# Patient Record
Sex: FEMALE | Race: BLACK OR AFRICAN AMERICAN | NOT HISPANIC OR LATINO | Employment: OTHER
[De-identification: names, ages, dates, MRNs, and addresses within clinical notes are randomized per-mention and may not be internally consistent; named-entity substitution may affect disease eponyms.]

---

## 2017-10-01 ENCOUNTER — HEALTH MAINTENANCE LETTER (OUTPATIENT)
Age: 22
End: 2017-10-01

## 2019-11-08 ENCOUNTER — ALLIED HEALTH/NURSE VISIT (OUTPATIENT)
Dept: FAMILY MEDICINE | Facility: CLINIC | Age: 24
End: 2019-11-08

## 2019-11-08 VITALS — WEIGHT: 171 LBS | BODY MASS INDEX: 31.28 KG/M2

## 2019-11-08 DIAGNOSIS — Z32.00 POSSIBLE PREGNANCY, NOT YET CONFIRMED: Primary | ICD-10-CM

## 2019-11-08 LAB — HCG UR QL: POSITIVE

## 2019-11-08 PROCEDURE — 99207 ZZC NO CHARGE NURSE ONLY: CPT

## 2019-11-08 PROCEDURE — 81025 URINE PREGNANCY TEST: CPT | Performed by: NURSE PRACTITIONER

## 2019-11-08 RX ORDER — PRENATAL VIT/IRON FUM/FOLIC AC 27MG-0.8MG
1 TABLET ORAL DAILY
COMMUNITY
End: 2021-06-10

## 2019-11-08 NOTE — PROGRESS NOTES
Cassie Duarte is a 24 year old here today for a pregnancy test.  LMP: Patient's last menstrual period was 2019 (within days).  Wt: 171 lbs 0 oz.    Symptoms include breast tenderness, absence of menses, nausea &/or vomiting and fatigue.    Cassie informed of positive pregnancy test results. ARNAUD: 2020    Educational advice given: nutrition, smoking and drugs & alcohol.    Current medications reviewed: Yes    Previous pregnancy history remarkable for: 1st pregnancy.    Plan: take multivitamin or pre-mitch vitamins, OB Education packet given and pt will callback to schedule. Has to check with her insurance first. Encouraged her to schedule soon.     She is to call back if she has any questions or concerns.  She is advised to notify a provider immediately if she experiences any severe cramping or abdominal pain or any vaginal bleeding.    Elidia Shannon, RN, BSN

## 2019-12-10 ENCOUNTER — PRENATAL OFFICE VISIT (OUTPATIENT)
Dept: OBGYN | Facility: CLINIC | Age: 24
End: 2019-12-10

## 2019-12-10 VITALS — WEIGHT: 173.5 LBS | BODY MASS INDEX: 31.73 KG/M2

## 2019-12-10 DIAGNOSIS — O09.30 LATE PRENATAL CARE: ICD-10-CM

## 2019-12-10 DIAGNOSIS — Z34.00 ENCOUNTER FOR SUPERVISION PREGNANCY IN PRIMIGRAVIDA, ANTEPARTUM: Primary | ICD-10-CM

## 2019-12-10 LAB
ERYTHROCYTE [DISTWIDTH] IN BLOOD BY AUTOMATED COUNT: 14.5 % (ref 10–15)
HCT VFR BLD AUTO: 30.6 % (ref 35–47)
HGB BLD-MCNC: 10.6 G/DL (ref 11.7–15.7)
MCH RBC QN AUTO: 27.9 PG (ref 26.5–33)
MCHC RBC AUTO-ENTMCNC: 34.6 G/DL (ref 31.5–36.5)
MCV RBC AUTO: 81 FL (ref 78–100)
PLATELET # BLD AUTO: 245 10E9/L (ref 150–450)
RBC # BLD AUTO: 3.8 10E12/L (ref 3.8–5.2)
WBC # BLD AUTO: 7.1 10E9/L (ref 4–11)

## 2019-12-10 PROCEDURE — 36415 COLL VENOUS BLD VENIPUNCTURE: CPT | Performed by: OBSTETRICS & GYNECOLOGY

## 2019-12-10 PROCEDURE — 86762 RUBELLA ANTIBODY: CPT | Performed by: OBSTETRICS & GYNECOLOGY

## 2019-12-10 PROCEDURE — 86850 RBC ANTIBODY SCREEN: CPT | Performed by: OBSTETRICS & GYNECOLOGY

## 2019-12-10 PROCEDURE — 87389 HIV-1 AG W/HIV-1&-2 AB AG IA: CPT | Performed by: OBSTETRICS & GYNECOLOGY

## 2019-12-10 PROCEDURE — 86780 TREPONEMA PALLIDUM: CPT | Performed by: OBSTETRICS & GYNECOLOGY

## 2019-12-10 PROCEDURE — G0145 SCR C/V CYTO,THINLAYER,RESCR: HCPCS | Performed by: OBSTETRICS & GYNECOLOGY

## 2019-12-10 PROCEDURE — 87491 CHLMYD TRACH DNA AMP PROBE: CPT | Performed by: OBSTETRICS & GYNECOLOGY

## 2019-12-10 PROCEDURE — 99207 ZZC FIRST OB VISIT: CPT | Performed by: OBSTETRICS & GYNECOLOGY

## 2019-12-10 PROCEDURE — 87591 N.GONORRHOEAE DNA AMP PROB: CPT | Performed by: OBSTETRICS & GYNECOLOGY

## 2019-12-10 PROCEDURE — 86901 BLOOD TYPING SEROLOGIC RH(D): CPT | Performed by: OBSTETRICS & GYNECOLOGY

## 2019-12-10 PROCEDURE — 86900 BLOOD TYPING SEROLOGIC ABO: CPT | Performed by: OBSTETRICS & GYNECOLOGY

## 2019-12-10 PROCEDURE — 87340 HEPATITIS B SURFACE AG IA: CPT | Performed by: OBSTETRICS & GYNECOLOGY

## 2019-12-10 PROCEDURE — 85027 COMPLETE CBC AUTOMATED: CPT | Performed by: OBSTETRICS & GYNECOLOGY

## 2019-12-10 ASSESSMENT — PATIENT HEALTH QUESTIONNAIRE - PHQ9: SUM OF ALL RESPONSES TO PHQ QUESTIONS 1-9: 3

## 2019-12-10 NOTE — PROGRESS NOTES
Cassie is a 24 year old  @  19w3d weeks here for new ob visit.    Here with FOB.  See Ob questionnaire for pertinent components of HPI.  Current Issues include: nausea, mild    OBhx: never pregnant  Gyne: Pap smears Normal  History reviewed. No pertinent past medical history.  History reviewed. No pertinent surgical history.  Patient Active Problem List    Diagnosis Date Noted     NO ACTIVE PROBLEMS 2012     Priority: Medium        Allergies   Allergen Reactions     Cats      Long haired female     Current Outpatient Medications   Medication Sig Dispense Refill     Prenatal Vit-Fe Fumarate-FA (PRENATAL MULTIVITAMIN W/IRON) 27-0.8 MG tablet Take 1 tablet by mouth daily         Past Medical History of Father of Baby:   No significant medical history    Physical Exam: Wt 78.7 kg (173 lb 8 oz)   LMP 2019 (Exact Date)   Breastfeeding No   BMI 31.73 kg/m    General: Well developed, well nourished female  Skin: Normal  HEENT: Normal  Neck: Supple,no adenopathy,thyroid normal  Chest: Clear  Heart: Regular rate, rhythm,No murmur, rub, gallop  Breasts: No masses, skin, nipple or axillary changes   Abdomen: Benign,Soft, flat, non-tender,No masses, organomegaly,No inguinal nodes,Bowel sounds normoactive   Extremities: Normal  Neurological: Normal   Perineum: Intact   Vulva: Normal  Vagina: Normal mucosa, no discharge  Cervix: Nulliparous, closed, mobile,  no discharge  Uterus: 17 weeks, Normal shape, position and consistency   Adnexa: Normal  Rectum: deferred, Normal without lesion or mass   Bony Pelvis: Adequate       A/P 24 year old  at  19w3d weeks    ICD-10-CM    1. Encounter for supervision pregnancy in primigravida, antepartum Z34.00 US OB > 14 Weeks     ABO/Rh type and screen     Hepatitis B surface antigen     Rubella Antibody IgG Quantitative     CBC with platelets     Urine Culture Aerobic Bacterial     Neisseria gonorrhoeae PCR     Chlamydia trachomatis PCR     HIV Antigen Antibody Combo      Treponema Abs w Reflex to RPR and Titer     Pap imaged thin layer screen reflex to HPV if ASCUS - recommend age 25 - 29   2. Late prenatal care O09.30 US OB > 14 Weeks     ABO/Rh type and screen     Hepatitis B surface antigen     Rubella Antibody IgG Quantitative     CBC with platelets     Urine Culture Aerobic Bacterial     Neisseria gonorrhoeae PCR     Chlamydia trachomatis PCR     HIV Antigen Antibody Combo     Treponema Abs w Reflex to RPR and Titer     Pap imaged thin layer screen reflex to HPV if ASCUS - recommend age 25 - 29       - Discussed physician coverage, tertiary support, diet, exercise, weight gain, schedule of visits, routine and indicated ultrasounds, and childbirth education.    - Options for  testing for chromosomal and birth defects were discussed with the patient.  Diagnostic tests include CVS and Amniocentesis.  We discussed that these tests are definitive but invasive and do carry a risk of fetal loss.    Screening tests include nuchal translucency/blood marker testing in the first trimester and quad screening in the second trimester.  We discussed that these are screening tests and not diagnostic tests and that false positives and negatives are a distinct possibility.      Prenatal package provided.  return to clinic in 4 weeks.    CEPHAS AGBEH, MD.

## 2019-12-10 NOTE — LETTER
December 16, 2019      Cassie Duarte  [id number]   [place] [address]    Dear ,      I am happy to inform you that your recent cervical cancer screening test (PAP smear) was normal.      Preventative screenings such as this help to ensure your health for years to come. You should repeat a pap smear in 3 years, unless otherwise directed.      You will still need to return to the clinic every year for your annual exam and other preventive tests.     If you have additional questions regarding this result, please call our registered nurse, Courtney at [phone].      Sincerely,      Cephas Mawuena Agbeh, MD/abhinav

## 2019-12-10 NOTE — PATIENT INSTRUCTIONS
If you have any questions regarding your visit, Please contact your care team.  [employer] [facility]: [phone]  [facility] CLINIC HOURS TELEPHONE NUMBER   Cephas Agbeh, M.D.          Sabino Bhagat         Monday-[place]    8:00a.m-4:45 p.m    Tuesday--[place]     8:00a.m-4:45 p.m.    Thursday-Jamie    8:00a.m-4:45 p.m.    Friday-Jamie    8:00a.m-4:45 p.m    [facility]   [address]   [place] [address] [phone]-Ask for [facility]   Fax [phone]   Olqcwbh-[phone]     [facility] Labor and Delivery   [address] [place] [address] [place] [address]   [phone]    [facility]  [address] [facility] [address]  [phone]  [phone]-[phone]   Urgent Care locations:    [facility] Monday-Friday  5 pm - 9 pm  Saturday and Sunday   9 am - 5 pm   Monday-Friday   5 pm - 9 pm  Saturday and Sunday  9 am - 5 pm    [phone]   If you need a medication refill, please contact your pharmacy. Please allow 3 business days for your refill to be completed.  As always, Thank you for trusting us with your healthcare needs!

## 2019-12-11 LAB
ABO + RH BLD: NORMAL
ABO + RH BLD: NORMAL
BLD GP AB SCN SERPL QL: NORMAL
BLOOD BANK CMNT PATIENT-IMP: NORMAL
C TRACH DNA SPEC QL NAA+PROBE: NEGATIVE
HBV SURFACE AG SERPL QL IA: NONREACTIVE
HIV 1+2 AB+HIV1 P24 AG SERPL QL IA: NONREACTIVE
N GONORRHOEA DNA SPEC QL NAA+PROBE: NEGATIVE
RUBV IGG SERPL IA-ACNC: 22 IU/ML
SPECIMEN EXP DATE BLD: NORMAL
SPECIMEN SOURCE: NORMAL
SPECIMEN SOURCE: NORMAL
T PALLIDUM AB SER QL: NONREACTIVE

## 2019-12-13 LAB
COPATH REPORT: NORMAL
PAP: NORMAL

## 2019-12-14 ENCOUNTER — ANCILLARY PROCEDURE (OUTPATIENT)
Dept: ULTRASOUND IMAGING | Facility: CLINIC | Age: 24
End: 2019-12-14
Attending: OBSTETRICS & GYNECOLOGY

## 2019-12-14 DIAGNOSIS — O09.30 LATE PRENATAL CARE: ICD-10-CM

## 2019-12-14 DIAGNOSIS — Z34.00 ENCOUNTER FOR SUPERVISION PREGNANCY IN PRIMIGRAVIDA, ANTEPARTUM: ICD-10-CM

## 2019-12-14 PROCEDURE — 76805 OB US >/= 14 WKS SNGL FETUS: CPT

## 2020-01-02 ENCOUNTER — MYC MEDICAL ADVICE (OUTPATIENT)
Dept: FAMILY MEDICINE | Facility: CLINIC | Age: 25
End: 2020-01-02

## 2020-01-02 NOTE — TELEPHONE ENCOUNTER
Sent a mychart stating Dr. Agbeh will discuss this at her upcoming OB appointment 01.07.2020  Domitila Everett M.A.

## 2020-01-07 ENCOUNTER — PRENATAL OFFICE VISIT (OUTPATIENT)
Dept: OBGYN | Facility: CLINIC | Age: 25
End: 2020-01-07
Payer: COMMERCIAL

## 2020-01-07 VITALS
BODY MASS INDEX: 32.3 KG/M2 | DIASTOLIC BLOOD PRESSURE: 71 MMHG | HEART RATE: 85 BPM | WEIGHT: 176.6 LBS | OXYGEN SATURATION: 100 % | SYSTOLIC BLOOD PRESSURE: 110 MMHG

## 2020-01-07 DIAGNOSIS — Z34.00 ENCOUNTER FOR SUPERVISION PREGNANCY IN PRIMIGRAVIDA, ANTEPARTUM: Primary | ICD-10-CM

## 2020-01-07 PROCEDURE — 99207 ZZC PRENATAL VISIT: CPT | Performed by: OBSTETRICS & GYNECOLOGY

## 2020-01-07 NOTE — PROGRESS NOTES
20w2d. Doing well without issues/concerns.  Quad screen not done per pt request   Routine anticipatory guidance.  U/S  Ordered.    ICD-10-CM    1. Encounter for supervision pregnancy in primigravida, antepartum Z34.00 US OB > 14 Weeks     Glucose tolerance gest screen 1 hour     ABO and Rh     Hemoglobin     CEPHAS AGBEH, MD.

## 2020-01-17 ENCOUNTER — ANCILLARY PROCEDURE (OUTPATIENT)
Dept: ULTRASOUND IMAGING | Facility: CLINIC | Age: 25
End: 2020-01-17
Attending: OBSTETRICS & GYNECOLOGY
Payer: COMMERCIAL

## 2020-01-17 DIAGNOSIS — Z34.00 ENCOUNTER FOR SUPERVISION PREGNANCY IN PRIMIGRAVIDA, ANTEPARTUM: ICD-10-CM

## 2020-01-17 DIAGNOSIS — Z34.00 ENCOUNTER FOR SUPERVISION PREGNANCY IN PRIMIGRAVIDA, ANTEPARTUM: Primary | ICD-10-CM

## 2020-01-17 DIAGNOSIS — O09.30 LATE PRENATAL CARE: ICD-10-CM

## 2020-01-17 PROCEDURE — 76805 OB US >/= 14 WKS SNGL FETUS: CPT

## 2020-02-04 ENCOUNTER — PRENATAL OFFICE VISIT (OUTPATIENT)
Dept: OBGYN | Facility: CLINIC | Age: 25
End: 2020-02-04

## 2020-02-04 VITALS
SYSTOLIC BLOOD PRESSURE: 122 MMHG | HEART RATE: 89 BPM | BODY MASS INDEX: 33.91 KG/M2 | WEIGHT: 185.4 LBS | DIASTOLIC BLOOD PRESSURE: 74 MMHG

## 2020-02-04 DIAGNOSIS — O09.30 LATE PRENATAL CARE: ICD-10-CM

## 2020-02-04 DIAGNOSIS — Z34.00 ENCOUNTER FOR SUPERVISION PREGNANCY IN PRIMIGRAVIDA, ANTEPARTUM: Primary | ICD-10-CM

## 2020-02-04 DIAGNOSIS — D50.8 IRON DEFICIENCY ANEMIA SECONDARY TO INADEQUATE DIETARY IRON INTAKE: ICD-10-CM

## 2020-02-04 DIAGNOSIS — Z34.00 ENCOUNTER FOR SUPERVISION PREGNANCY IN PRIMIGRAVIDA, ANTEPARTUM: ICD-10-CM

## 2020-02-04 LAB
ABO + RH BLD: NORMAL
ABO + RH BLD: NORMAL
GLUCOSE 1H P 50 G GLC PO SERPL-MCNC: 85 MG/DL (ref 60–129)
HGB BLD-MCNC: 10.1 G/DL (ref 11.7–15.7)
SPECIMEN EXP DATE BLD: NORMAL

## 2020-02-04 PROCEDURE — 99207 ZZC PRENATAL VISIT: CPT | Performed by: OBSTETRICS & GYNECOLOGY

## 2020-02-04 PROCEDURE — 87186 SC STD MICRODIL/AGAR DIL: CPT | Performed by: OBSTETRICS & GYNECOLOGY

## 2020-02-04 PROCEDURE — 85018 HEMOGLOBIN: CPT | Performed by: OBSTETRICS & GYNECOLOGY

## 2020-02-04 PROCEDURE — 36415 COLL VENOUS BLD VENIPUNCTURE: CPT | Performed by: OBSTETRICS & GYNECOLOGY

## 2020-02-04 PROCEDURE — 86901 BLOOD TYPING SEROLOGIC RH(D): CPT | Performed by: OBSTETRICS & GYNECOLOGY

## 2020-02-04 PROCEDURE — 87088 URINE BACTERIA CULTURE: CPT | Performed by: OBSTETRICS & GYNECOLOGY

## 2020-02-04 PROCEDURE — 86900 BLOOD TYPING SEROLOGIC ABO: CPT | Performed by: OBSTETRICS & GYNECOLOGY

## 2020-02-04 PROCEDURE — 82950 GLUCOSE TEST: CPT | Performed by: OBSTETRICS & GYNECOLOGY

## 2020-02-04 PROCEDURE — 87086 URINE CULTURE/COLONY COUNT: CPT | Performed by: OBSTETRICS & GYNECOLOGY

## 2020-02-04 RX ORDER — FERROUS SULFATE 325(65) MG
325 TABLET ORAL 2 TIMES DAILY
Qty: 60 TABLET | Refills: 3 | Status: SHIPPED | OUTPATIENT
Start: 2020-02-04 | End: 2021-06-10

## 2020-02-04 NOTE — LETTER
[address] [address]  [phone]      February 4, 2020      RE: Cassie Duarte  [address]   [place] [address]       To whom it may concern:    Cassie Duarte was seen in our clinic today.  She may return to work with the following: Light duty-unable to lift more than 20 pounds  May not work more than 40 hors per week and not more than 8 hrs per day.      Sincerely,      Cephas Mawuena Agbeh MD

## 2020-02-04 NOTE — PATIENT INSTRUCTIONS
If you have any questions regarding your visit, Please contact your care team.  [employer] [facility]: [phone]  [facility] CLINIC HOURS TELEPHONE NUMBER   Cephas Agbeh, M.D.      Sabino Ramesh-  Dhara-         Monday-[place]    8:00a.m-4:45 p.m    Tuesday--[place]     8:00a.m-4:45 p.m.    Thursday-Jamie    8:00a.m-4:45 p.m.    Friday-[place]    8:00a.m-4:45 p.m    [facility]   [address] [place] [address]   [phone]-Ask for [facility]   Fax [phone]   Fbofrnw-[phone]     [facility] Labor and Delivery   [address] [place] [address] [place] [address]   [phone]    [facility]  [address] [address]  [phone]  Aktxxcu-[phone]   Urgent Care locations:    [facility] Monday-Friday  5 pm - 9 pm  Saturday and Sunday   9 am - 5 pm   Monday-Friday   5 pm - 9 pm  Saturday and Sunday  9 am - 5 pm    [phone]   If you need a medication refill, please contact your pharmacy. Please allow 3 business days for your refill to be completed.  As always, Thank you for trusting us with your healthcare needs!

## 2020-02-04 NOTE — PROGRESS NOTES
24w2d  Doing well without issues/concerns.Work note provided.  Routine anticipatory guidance.  F/UUS ordered.  RTC 4wk.  Glucola given. Plan for  GCT, Hgb.    ICD-10-CM    1. Encounter for supervision pregnancy in primigravida, antepartum Z34.00 US OB >14 Weeks Follow Up   2. Late prenatal care O09.30 US OB >14 Weeks Follow Up     CEPHAS AGBEH, MD.

## 2020-02-06 DIAGNOSIS — N30.00 ACUTE CYSTITIS WITHOUT HEMATURIA: Primary | ICD-10-CM

## 2020-02-06 LAB
BACTERIA SPEC CULT: ABNORMAL
BACTERIA SPEC CULT: ABNORMAL
SPECIMEN SOURCE: ABNORMAL

## 2020-02-06 RX ORDER — NITROFURANTOIN 25; 75 MG/1; MG/1
100 CAPSULE ORAL 2 TIMES DAILY
Qty: 14 CAPSULE | Refills: 0 | Status: SHIPPED | OUTPATIENT
Start: 2020-02-06 | End: 2020-04-09

## 2020-02-23 ENCOUNTER — HEALTH MAINTENANCE LETTER (OUTPATIENT)
Age: 25
End: 2020-02-23

## 2020-02-24 ENCOUNTER — ANCILLARY PROCEDURE (OUTPATIENT)
Dept: ULTRASOUND IMAGING | Facility: CLINIC | Age: 25
End: 2020-02-24
Attending: OBSTETRICS & GYNECOLOGY
Payer: COMMERCIAL

## 2020-02-24 ENCOUNTER — PRENATAL OFFICE VISIT (OUTPATIENT)
Dept: OBGYN | Facility: CLINIC | Age: 25
End: 2020-02-24
Payer: COMMERCIAL

## 2020-02-24 ENCOUNTER — TRANSFERRED RECORDS (OUTPATIENT)
Dept: HEALTH INFORMATION MANAGEMENT | Facility: CLINIC | Age: 25
End: 2020-02-24

## 2020-02-24 DIAGNOSIS — Z34.00 ENCOUNTER FOR SUPERVISION PREGNANCY IN PRIMIGRAVIDA, ANTEPARTUM: ICD-10-CM

## 2020-02-24 DIAGNOSIS — O60.02 PRETERM LABOR IN SECOND TRIMESTER WITHOUT DELIVERY: Primary | ICD-10-CM

## 2020-02-24 DIAGNOSIS — O09.30 LATE PRENATAL CARE: ICD-10-CM

## 2020-02-24 PROCEDURE — 76817 TRANSVAGINAL US OBSTETRIC: CPT | Performed by: RADIOLOGY

## 2020-02-24 PROCEDURE — 76805 OB US >/= 14 WKS SNGL FETUS: CPT | Performed by: RADIOLOGY

## 2020-02-24 PROCEDURE — 99207 ZZC COMPLICATED OB VISIT: CPT | Performed by: OBSTETRICS & GYNECOLOGY

## 2020-02-24 NOTE — PROGRESS NOTES
I received a phone call from Dr. Cody, the radiologist, who was concerned with today's US findings.  At 27 1/7 weeks gestation, the patient's cervix was noted to be dilated at 1.3 cm with fluid visible and 3 cm in length.  The patient c/o feeling pelvic pressure.  Therefore, she was sent directly to L&D after I reviewed these findings with the patient and then I called and gave report to Dr. Schmidt and Nathalia FLOREZ charge RN.  The patient understands to have her  drive her directly to [facility] and they voiced understanding.

## 2020-02-24 NOTE — PATIENT INSTRUCTIONS
If you have any questions regarding your visit, Please contact your care team.    [employer] [facility]: [phone]      [facility] HOURS TELEPHONE NUMBER   Ghada Fernandezmaco .    MYLA Smith RN Amber, ORACIO Engel RN     Monday, Wednesday, Thursday and Friday, [place]  8:30a.m-5:00 p.m   [facility]  [address] [place] [address]  [phone] ask for [facility]    Imaging [id number]-[phone]       Urgent Care locations:    [facility] Saturday and Sunday   9 am - 5 pm    Monday-Friday   12 pm - 8 pm  Saturday and Sunday   9 am - 5 pm   [phone]     [facility] Labor and Delivery:  [phone]    If you need a medication refill, please contact your pharmacy. Please allow 3 business days for your refill to be completed.  As always, Thank you for trusting us with your healthcare needs!

## 2020-02-25 ENCOUNTER — TRANSFERRED RECORDS (OUTPATIENT)
Dept: HEALTH INFORMATION MANAGEMENT | Facility: CLINIC | Age: 25
End: 2020-02-25

## 2020-04-09 ENCOUNTER — VIRTUAL VISIT (OUTPATIENT)
Dept: OBGYN | Facility: CLINIC | Age: 25
End: 2020-04-09
Payer: COMMERCIAL

## 2020-04-09 PROCEDURE — 99207 ZZC POST PARTUM EXAM: CPT | Mod: TEL | Performed by: OBSTETRICS & GYNECOLOGY

## 2020-04-09 ASSESSMENT — PATIENT HEALTH QUESTIONNAIRE - PHQ9: SUM OF ALL RESPONSES TO PHQ QUESTIONS 1-9: 5

## 2020-04-09 NOTE — PROGRESS NOTES
"Cassie Duarte is a 24 year old female who is being evaluated via a billable telephone visit.      The patient has been notified of following:     \"This telephone visit will be conducted via a call between you and your physician/provider. We have found that certain health care needs can be provided without the need for a physical exam.  This service lets us provide the care you need with a short phone conversation.  If a prescription is necessary we can send it directly to your pharmacy.  If lab work is needed we can place an order for that and you can then stop by our lab to have the test done at a later time.    Telephone visits are billed at different rates depending on your insurance coverage. During this emergency period, for some insurers they may be billed the same as an in-person visit.  Please reach out to your insurance provider with any questions.    If during the course of the call the physician/provider feels a telephone visit is not appropriate, you will not be charged for this service.\"    Patient has given verbal consent for Telephone visit?  Yes    How would you like to obtain your AVS? Vidalmaria esther    Cassie Duarte complains of    Chief Complaint   Patient presents with     Post Partum Exam       I have reviewed and updated the patient's Past Medical History, Social History, Family History and Medication List.    ARTHUR Matthews is here for a 6-week postpartum checkup.    She had a Primary  of a liveborn baby girl, weight 2 pounds 6 oz. ( premature) PTL. Baby still in hospital. She says incision is well healed. The delivery was uncomplicated.  Since delivery, she has been breast/pumping..  She has had a normal menses.  She has not had intercourse.  Patient screened for postpartum depression and complaints are NEGATIVE. Screening has also been completed for intimate partner violence.   OB History    Para Term  AB Living   1 1 0 1 0 1   SAB TAB Ectopic Multiple Live Births   0 0 0 0 1 "      # Outcome Date GA Lbr David/2nd Weight Sex Delivery Anes PTL Lv   1  20 27w3d  1.077 kg (2 lb 6 oz) F CS-Classical Spinal  RENITA      Complications:  labor, Footling breech presentation      Name: Lora       Her last pap was 12/10/19 and was Normal    EXAM: LMP 2019 (Exact Date)       A/P  Routine Postpartum    ICD-10-CM    1. Routine postpartum follow-up  Z39.2        1. Contraception: condom  2. Annual due in  every 12 months    CEPHAS AGBEH, MD.     Phone call duration: 15 minutes  CEPHAS AGBEH, MD.

## 2020-06-06 ENCOUNTER — TRANSFERRED RECORDS (OUTPATIENT)
Dept: HEALTH INFORMATION MANAGEMENT | Facility: CLINIC | Age: 25
End: 2020-06-06

## 2020-10-09 ENCOUNTER — PRENATAL OFFICE VISIT (OUTPATIENT)
Dept: OBGYN | Facility: CLINIC | Age: 25
End: 2020-10-09

## 2020-10-09 ENCOUNTER — TELEPHONE (OUTPATIENT)
Dept: OBGYN | Facility: CLINIC | Age: 25
End: 2020-10-09

## 2020-10-09 VITALS
OXYGEN SATURATION: 98 % | BODY MASS INDEX: 32.01 KG/M2 | DIASTOLIC BLOOD PRESSURE: 81 MMHG | SYSTOLIC BLOOD PRESSURE: 121 MMHG | WEIGHT: 175 LBS | HEART RATE: 89 BPM

## 2020-10-09 DIAGNOSIS — O09.891 SUPERVISION OF OTHER HIGH RISK PREGNANCIES, FIRST TRIMESTER: ICD-10-CM

## 2020-10-09 DIAGNOSIS — Z36.87 UNSURE OF LMP (LAST MENSTRUAL PERIOD) AS REASON FOR ULTRASOUND SCAN: ICD-10-CM

## 2020-10-09 DIAGNOSIS — O09.299 HISTORY OF CERVICAL INCOMPETENCE IN PREGNANCY, CURRENTLY PREGNANT: Primary | ICD-10-CM

## 2020-10-09 PROCEDURE — 99207 PR FIRST OB VISIT: CPT | Performed by: OBSTETRICS & GYNECOLOGY

## 2020-10-09 ASSESSMENT — PAIN SCALES - GENERAL: PAINLEVEL: NO PAIN (0)

## 2020-10-09 NOTE — PROGRESS NOTES
Cassie is a 25 year old female, , who is here today for her first OB visit at 8 5/7 weeks gestation.  She has had a positive home pregnancy test.  This is a high-risk pregnancy since she experienced cervical incompetence during her previous pregnancy and required a C/S for delivery at 27 3/7 weeks gestation (double footling breech) on 2020.  This is a closely-spaced pregnancy and she is not currently breastfeeding.  She felt flutters beginning 2-3 days ago.  She quit use of marijuana but has not started taking a PNV yet.      ROS: Ten point review of systems was reviewed and negative except the above.    Gyn Hx:      History reviewed. No pertinent past medical history.  Past Surgical History:   Procedure Laterality Date      SECTION       Patient Active Problem List   Diagnosis     NO ACTIVE PROBLEMS       ALL/Meds: Her medication and allergy histories were reviewed and are documented in their appropriate chart areas.    SH: Reviewed and documented in the appropriate area of the chart.    FH: Her family history was reviewed and documented in its appropriate chart area.    PE: /81 (BP Location: Left arm, Patient Position: Chair, Cuff Size: Adult Regular)   Pulse 89   Wt 79.4 kg (175 lb)   LMP 2020 (Exact Date)   SpO2 98%   Breastfeeding No   BMI 32.01 kg/m    Body mass index is 32.01 kg/m .    Urine HCG was +  Hrt - RRR without murmur  Lungs - CTAB  Neck - supple without palpable mass  Breasts - patient declined exam  Abd - soft, nontender, unable to hear fhts with the doppler today so will check with US, BS x 4  Pelvic - patient declined exam  Ext - negative    A/P:   - I discussed with her nutrition and medication concerns related to pregnancy.  We discussed folic acid supplementation.  We reviewed prenatal care.  She is given the opportunity to ask questions and have them answered.  Schedule an OB US to confirm dates and viability.  Refer to [facility] for possible cerclage  placement due to this patient's hx of cervical incompetence.  Will also need to find her op report to determine if she will be a  candidate or not.  She was encouraged to start taking a PNV daily po and to avoid marijuana usage.  Check OB labwork today.    30 minutes were spent face to face with the patient today discussing her history, diagnosis, and follow-up plan as noted above.  Over 50% of the visit was spent in counseling and coordination of care.    Total Visit Time: 30 minutes.    Orders Placed This Encounter   Procedures     US OB <14 Weeks w Transvaginal Single     CBC with platelets     HIV Antigen Antibody Combo     Treponema Abs w Reflex to RPR and Titer     Hepatitis B surface antigen     Rubella Antibody IgG Quantitative     PERINATOLOGY REFERRAL     ABO and Rh     Ghada Rosas DO  FACOG, FACS

## 2020-10-09 NOTE — TELEPHONE ENCOUNTER
Pt needs to have prenatal labs drawn.    Please call pt to schedule lab only appt.    Stacey Chávez RN on 10/9/2020 at 5:13 PM

## 2020-10-09 NOTE — PATIENT INSTRUCTIONS
If you have any questions regarding your visit, Please contact your care team.    [employer] [facility]: [phone]      [facility] CLINIC HOURS TELEPHONE NUMBER   Ghada Rosas DO.    MYLA Savage -  Dhara -       ORACIO Jones, RN  Stacey, RN     Monday, Wednesday, Thursday and Friday, [place]  8:30a.m-5:00 p.m   [facility]  [address] [place] [address]  [phone] ask for [facility]    Imaging [id number]-[phone]       Urgent Care locations:    [facility] Saturday and Sunday   9 am - 5 pm    Monday-Friday   12 pm - 8 pm  Saturday and Sunday   9 am - 5 pm   [phone]     [facility] Labor and Delivery:  [phone]    If you need a medication refill, please contact your pharmacy. Please allow 3 business days for your refill to be completed.  As always, Thank you for trusting us with your healthcare needs!

## 2020-10-12 NOTE — TELEPHONE ENCOUNTER
RN called and assisted pt in scheduling lab appt following her US appt on 10/15/2020.    Patient verbalized understanding and agreed to plan.     Stacey Chávez RN on 10/12/2020 at 9:43 AM

## 2020-10-15 ENCOUNTER — ANCILLARY PROCEDURE (OUTPATIENT)
Dept: ULTRASOUND IMAGING | Facility: CLINIC | Age: 25
End: 2020-10-15
Attending: OBSTETRICS & GYNECOLOGY

## 2020-10-15 DIAGNOSIS — Z36.87 UNSURE OF LMP (LAST MENSTRUAL PERIOD) AS REASON FOR ULTRASOUND SCAN: ICD-10-CM

## 2020-10-15 DIAGNOSIS — O09.891 SUPERVISION OF OTHER HIGH RISK PREGNANCIES, FIRST TRIMESTER: ICD-10-CM

## 2020-10-15 LAB
ABO + RH BLD: NORMAL
ABO + RH BLD: NORMAL
ERYTHROCYTE [DISTWIDTH] IN BLOOD BY AUTOMATED COUNT: 12.9 % (ref 10–15)
HCT VFR BLD AUTO: 31.8 % (ref 35–47)
HGB BLD-MCNC: 11.3 G/DL (ref 11.7–15.7)
MCH RBC QN AUTO: 28.1 PG (ref 26.5–33)
MCHC RBC AUTO-ENTMCNC: 35.5 G/DL (ref 31.5–36.5)
MCV RBC AUTO: 79 FL (ref 78–100)
PLATELET # BLD AUTO: 234 10E9/L (ref 150–450)
RBC # BLD AUTO: 4.02 10E12/L (ref 3.8–5.2)
SPECIMEN EXP DATE BLD: NORMAL
WBC # BLD AUTO: 5.6 10E9/L (ref 4–11)

## 2020-10-15 PROCEDURE — 99000 SPECIMEN HANDLING OFFICE-LAB: CPT | Performed by: OBSTETRICS & GYNECOLOGY

## 2020-10-15 PROCEDURE — 85027 COMPLETE CBC AUTOMATED: CPT | Performed by: OBSTETRICS & GYNECOLOGY

## 2020-10-15 PROCEDURE — 76817 TRANSVAGINAL US OBSTETRIC: CPT

## 2020-10-15 PROCEDURE — 86901 BLOOD TYPING SEROLOGIC RH(D): CPT | Performed by: OBSTETRICS & GYNECOLOGY

## 2020-10-15 PROCEDURE — 87086 URINE CULTURE/COLONY COUNT: CPT | Performed by: OBSTETRICS & GYNECOLOGY

## 2020-10-15 PROCEDURE — 86780 TREPONEMA PALLIDUM: CPT | Mod: 90 | Performed by: OBSTETRICS & GYNECOLOGY

## 2020-10-15 PROCEDURE — 76801 OB US < 14 WKS SINGLE FETUS: CPT

## 2020-10-15 PROCEDURE — 87186 SC STD MICRODIL/AGAR DIL: CPT | Performed by: OBSTETRICS & GYNECOLOGY

## 2020-10-15 PROCEDURE — 86900 BLOOD TYPING SEROLOGIC ABO: CPT | Performed by: OBSTETRICS & GYNECOLOGY

## 2020-10-15 PROCEDURE — 87340 HEPATITIS B SURFACE AG IA: CPT | Performed by: OBSTETRICS & GYNECOLOGY

## 2020-10-15 PROCEDURE — 87389 HIV-1 AG W/HIV-1&-2 AB AG IA: CPT | Performed by: OBSTETRICS & GYNECOLOGY

## 2020-10-15 PROCEDURE — 87088 URINE BACTERIA CULTURE: CPT | Performed by: OBSTETRICS & GYNECOLOGY

## 2020-10-15 PROCEDURE — 36415 COLL VENOUS BLD VENIPUNCTURE: CPT | Performed by: OBSTETRICS & GYNECOLOGY

## 2020-10-15 PROCEDURE — 86762 RUBELLA ANTIBODY: CPT | Performed by: OBSTETRICS & GYNECOLOGY

## 2020-10-16 LAB
HBV SURFACE AG SERPL QL IA: NONREACTIVE
HIV 1+2 AB+HIV1 P24 AG SERPL QL IA: NONREACTIVE
RUBV IGG SERPL IA-ACNC: 21 IU/ML
T PALLIDUM AB SER QL: NONREACTIVE

## 2020-10-18 LAB
BACTERIA SPEC CULT: ABNORMAL
Lab: ABNORMAL
SPECIMEN SOURCE: ABNORMAL

## 2020-10-19 DIAGNOSIS — N30.00 ACUTE CYSTITIS WITHOUT HEMATURIA: Primary | ICD-10-CM

## 2020-10-19 RX ORDER — AMOXICILLIN AND CLAVULANATE POTASSIUM 500; 125 MG/1; MG/1
1 TABLET, FILM COATED ORAL 3 TIMES DAILY
Qty: 21 TABLET | Refills: 0 | Status: SHIPPED | OUTPATIENT
Start: 2020-10-19 | End: 2020-12-04

## 2020-10-30 ENCOUNTER — TRANSFERRED RECORDS (OUTPATIENT)
Dept: HEALTH INFORMATION MANAGEMENT | Facility: CLINIC | Age: 25
End: 2020-10-30

## 2020-12-04 ENCOUNTER — PRENATAL OFFICE VISIT (OUTPATIENT)
Dept: OBGYN | Facility: OTHER | Age: 25
End: 2020-12-04

## 2020-12-04 VITALS
BODY MASS INDEX: 32.24 KG/M2 | DIASTOLIC BLOOD PRESSURE: 76 MMHG | WEIGHT: 176.25 LBS | SYSTOLIC BLOOD PRESSURE: 110 MMHG | HEART RATE: 96 BPM | TEMPERATURE: 98.1 F

## 2020-12-04 DIAGNOSIS — Z98.891 HISTORY OF CLASSICAL CESAREAN SECTION: ICD-10-CM

## 2020-12-04 DIAGNOSIS — Z23 NEED FOR PROPHYLACTIC VACCINATION AND INOCULATION AGAINST INFLUENZA: ICD-10-CM

## 2020-12-04 DIAGNOSIS — Z23 NEED FOR TDAP VACCINATION: ICD-10-CM

## 2020-12-04 DIAGNOSIS — O09.292 HIGH RISK PREGNANCY DUE TO HISTORY OF PREVIOUS OBSTETRICAL PROBLEM, SECOND TRIMESTER: Primary | ICD-10-CM

## 2020-12-04 DIAGNOSIS — O09.892 SHORT INTERVAL BETWEEN PREGNANCIES AFFECTING PREGNANCY IN SECOND TRIMESTER, ANTEPARTUM: ICD-10-CM

## 2020-12-04 PROCEDURE — 90686 IIV4 VACC NO PRSV 0.5 ML IM: CPT | Performed by: ADVANCED PRACTICE MIDWIFE

## 2020-12-04 PROCEDURE — 90471 IMMUNIZATION ADMIN: CPT | Performed by: ADVANCED PRACTICE MIDWIFE

## 2020-12-04 PROCEDURE — 99207 PR COMPLICATED OB VISIT: CPT | Performed by: ADVANCED PRACTICE MIDWIFE

## 2020-12-04 NOTE — PROGRESS NOTES
Feeling well.  No complaints other than a little bit of continued nausea.  Feels like she is managing this ok.   No contra/lof/vb or change in vaginal discharge.   Unaware of any follow up scheduled at [facility]  Called and they have orders so will call Cassie today and schedule follow up for cervical length and fetal survey.   Will do flu vaccine today  RTC 4 weeks.   Nga Abrams, AMINA, CNM

## 2020-12-07 ENCOUNTER — TRANSFERRED RECORDS (OUTPATIENT)
Dept: HEALTH INFORMATION MANAGEMENT | Facility: CLINIC | Age: 25
End: 2020-12-07

## 2020-12-21 ENCOUNTER — TRANSFERRED RECORDS (OUTPATIENT)
Dept: HEALTH INFORMATION MANAGEMENT | Facility: CLINIC | Age: 25
End: 2020-12-21

## 2021-01-04 ENCOUNTER — HOSPITAL ENCOUNTER (OUTPATIENT)
Facility: CLINIC | Age: 26
Setting detail: OBSERVATION
Discharge: HOME | End: 2021-01-05
Attending: OBSTETRICS & GYNECOLOGY | Admitting: OBSTETRICS & GYNECOLOGY
Payer: MEDICAID

## 2021-01-04 ENCOUNTER — TRANSFERRED RECORDS (OUTPATIENT)
Dept: HEALTH INFORMATION MANAGEMENT | Facility: CLINIC | Age: 26
End: 2021-01-04

## 2021-01-04 ENCOUNTER — ANESTHESIA EVENT (OUTPATIENT)
Dept: OBGYN | Facility: CLINIC | Age: 26
End: 2021-01-04
Payer: MEDICAID

## 2021-01-04 PROBLEM — O26.879 SHORT CERVIX AFFECTING PREGNANCY: Status: ACTIVE | Noted: 2021-01-04

## 2021-01-04 LAB
ABO + RH BLD: NORMAL
ABO + RH BLD: NORMAL
ALBUMIN UR-MCNC: NEGATIVE MG/DL
AMORPH CRY #/AREA URNS HPF: ABNORMAL /HPF
AMPHETAMINES UR QL SCN: NEGATIVE
APPEARANCE UR: ABNORMAL
BASOPHILS # BLD AUTO: 0 10E9/L (ref 0–0.2)
BASOPHILS NFR BLD AUTO: 0.4 %
BILIRUB UR QL STRIP: NEGATIVE
BLD GP AB SCN SERPL QL: NORMAL
BLOOD BANK CMNT PATIENT-IMP: NORMAL
CANNABINOIDS UR QL: NEGATIVE
COCAINE UR QL: NEGATIVE
COLOR UR AUTO: ABNORMAL
DIFFERENTIAL METHOD BLD: ABNORMAL
EOSINOPHIL # BLD AUTO: 0.1 10E9/L (ref 0–0.7)
EOSINOPHIL NFR BLD AUTO: 1.9 %
ERYTHROCYTE [DISTWIDTH] IN BLOOD BY AUTOMATED COUNT: 12.9 % (ref 10–15)
GLUCOSE UR STRIP-MCNC: NEGATIVE MG/DL
HCT VFR BLD AUTO: 28.2 % (ref 35–47)
HGB BLD-MCNC: 9.9 G/DL (ref 11.7–15.7)
HGB UR QL STRIP: ABNORMAL
IMM GRANULOCYTES # BLD: 0 10E9/L (ref 0–0.4)
IMM GRANULOCYTES NFR BLD: 0.3 %
KETONES UR STRIP-MCNC: NEGATIVE MG/DL
LABORATORY COMMENT REPORT: NORMAL
LEUKOCYTE ESTERASE UR QL STRIP: NEGATIVE
LYMPHOCYTES # BLD AUTO: 1.8 10E9/L (ref 0.8–5.3)
LYMPHOCYTES NFR BLD AUTO: 26.4 %
MCH RBC QN AUTO: 28.9 PG (ref 26.5–33)
MCHC RBC AUTO-ENTMCNC: 35.1 G/DL (ref 31.5–36.5)
MCV RBC AUTO: 83 FL (ref 78–100)
MONOCYTES # BLD AUTO: 0.6 10E9/L (ref 0–1.3)
MONOCYTES NFR BLD AUTO: 8.2 %
MUCOUS THREADS #/AREA URNS LPF: PRESENT /LPF
NEUTROPHILS # BLD AUTO: 4.3 10E9/L (ref 1.6–8.3)
NEUTROPHILS NFR BLD AUTO: 62.8 %
NITRATE UR QL: NEGATIVE
NRBC # BLD AUTO: 0 10*3/UL
NRBC BLD AUTO-RTO: 0 /100
OPIATES UR QL SCN: NEGATIVE
PCP UR QL SCN: NEGATIVE
PH UR STRIP: 6.5 PH (ref 5–7)
PLATELET # BLD AUTO: 200 10E9/L (ref 150–450)
RBC # BLD AUTO: 3.42 10E12/L (ref 3.8–5.2)
RBC #/AREA URNS AUTO: 5 /HPF (ref 0–2)
SARS-COV-2 RNA SPEC QL NAA+PROBE: NEGATIVE
SOURCE: ABNORMAL
SP GR UR STRIP: 1.01 (ref 1–1.03)
SPECIMEN EXP DATE BLD: NORMAL
SPECIMEN SOURCE: NORMAL
SPECIMEN SOURCE: NORMAL
SQUAMOUS #/AREA URNS AUTO: 3 /HPF (ref 0–1)
UROBILINOGEN UR STRIP-MCNC: NORMAL MG/DL (ref 0–2)
WBC # BLD AUTO: 6.8 10E9/L (ref 4–11)
WBC #/AREA URNS AUTO: 1 /HPF (ref 0–5)
WET PREP SPEC: NORMAL

## 2021-01-04 PROCEDURE — 120N000002 HC R&B MED SURG/OB UMMC

## 2021-01-04 PROCEDURE — 86901 BLOOD TYPING SEROLOGIC RH(D): CPT | Performed by: STUDENT IN AN ORGANIZED HEALTH CARE EDUCATION/TRAINING PROGRAM

## 2021-01-04 PROCEDURE — 250N000013 HC RX MED GY IP 250 OP 250 PS 637: Performed by: STUDENT IN AN ORGANIZED HEALTH CARE EDUCATION/TRAINING PROGRAM

## 2021-01-04 PROCEDURE — 85025 COMPLETE CBC W/AUTO DIFF WBC: CPT | Performed by: STUDENT IN AN ORGANIZED HEALTH CARE EDUCATION/TRAINING PROGRAM

## 2021-01-04 PROCEDURE — 80307 DRUG TEST PRSMV CHEM ANLYZR: CPT | Performed by: STUDENT IN AN ORGANIZED HEALTH CARE EDUCATION/TRAINING PROGRAM

## 2021-01-04 PROCEDURE — 87635 SARS-COV-2 COVID-19 AMP PRB: CPT | Performed by: STUDENT IN AN ORGANIZED HEALTH CARE EDUCATION/TRAINING PROGRAM

## 2021-01-04 PROCEDURE — 87491 CHLMYD TRACH DNA AMP PROBE: CPT | Performed by: STUDENT IN AN ORGANIZED HEALTH CARE EDUCATION/TRAINING PROGRAM

## 2021-01-04 PROCEDURE — 87591 N.GONORRHOEAE DNA AMP PROB: CPT | Performed by: STUDENT IN AN ORGANIZED HEALTH CARE EDUCATION/TRAINING PROGRAM

## 2021-01-04 PROCEDURE — 86900 BLOOD TYPING SEROLOGIC ABO: CPT | Performed by: STUDENT IN AN ORGANIZED HEALTH CARE EDUCATION/TRAINING PROGRAM

## 2021-01-04 PROCEDURE — 86850 RBC ANTIBODY SCREEN: CPT | Performed by: STUDENT IN AN ORGANIZED HEALTH CARE EDUCATION/TRAINING PROGRAM

## 2021-01-04 PROCEDURE — 87210 SMEAR WET MOUNT SALINE/INK: CPT | Performed by: STUDENT IN AN ORGANIZED HEALTH CARE EDUCATION/TRAINING PROGRAM

## 2021-01-04 PROCEDURE — 81001 URINALYSIS AUTO W/SCOPE: CPT | Performed by: STUDENT IN AN ORGANIZED HEALTH CARE EDUCATION/TRAINING PROGRAM

## 2021-01-04 PROCEDURE — 87086 URINE CULTURE/COLONY COUNT: CPT | Performed by: STUDENT IN AN ORGANIZED HEALTH CARE EDUCATION/TRAINING PROGRAM

## 2021-01-04 RX ORDER — DIPHENHYDRAMINE HYDROCHLORIDE 50 MG/ML
25 INJECTION INTRAMUSCULAR; INTRAVENOUS EVERY 6 HOURS PRN
Status: DISCONTINUED | OUTPATIENT
Start: 2021-01-04 | End: 2021-01-05 | Stop reason: HOSPADM

## 2021-01-04 RX ORDER — ACETAMINOPHEN 325 MG/1
975 TABLET ORAL EVERY 4 HOURS PRN
Status: DISCONTINUED | OUTPATIENT
Start: 2021-01-04 | End: 2021-01-05 | Stop reason: HOSPADM

## 2021-01-04 RX ORDER — HYDROXYZINE HYDROCHLORIDE 50 MG/1
50-100 TABLET, FILM COATED ORAL
Status: DISCONTINUED | OUTPATIENT
Start: 2021-01-04 | End: 2021-01-05 | Stop reason: HOSPADM

## 2021-01-04 RX ORDER — CEFAZOLIN SODIUM 2 G/100ML
2 INJECTION, SOLUTION INTRAVENOUS
Status: COMPLETED | OUTPATIENT
Start: 2021-01-05 | End: 2021-01-05

## 2021-01-04 RX ORDER — SODIUM CHLORIDE, SODIUM LACTATE, POTASSIUM CHLORIDE, CALCIUM CHLORIDE 600; 310; 30; 20 MG/100ML; MG/100ML; MG/100ML; MG/100ML
INJECTION, SOLUTION INTRAVENOUS CONTINUOUS
Status: DISCONTINUED | OUTPATIENT
Start: 2021-01-05 | End: 2021-01-05 | Stop reason: HOSPADM

## 2021-01-04 RX ORDER — INDOMETHACIN 50 MG/1
100 CAPSULE ORAL ONCE
Status: COMPLETED | OUTPATIENT
Start: 2021-01-05 | End: 2021-01-05

## 2021-01-04 RX ORDER — ONDANSETRON 2 MG/ML
4 INJECTION INTRAMUSCULAR; INTRAVENOUS EVERY 6 HOURS PRN
Status: DISCONTINUED | OUTPATIENT
Start: 2021-01-04 | End: 2021-01-05 | Stop reason: HOSPADM

## 2021-01-04 RX ORDER — DIPHENHYDRAMINE HCL 25 MG
25 CAPSULE ORAL EVERY 6 HOURS PRN
Status: DISCONTINUED | OUTPATIENT
Start: 2021-01-04 | End: 2021-01-05 | Stop reason: HOSPADM

## 2021-01-04 RX ADMIN — HYDROXYZINE HYDROCHLORIDE 100 MG: 50 TABLET, FILM COATED ORAL at 23:45

## 2021-01-04 NOTE — PROVIDER NOTIFICATION
01/04/21 1736   Provider Notification   Provider Name/Title Rigo   Method of Notification Phone   Request Evaluate in Person   Notification Reason Patient Arrived   Notified provider of patient arrival for rescue cerclage. Will send covid swab once order is in. Pt denies any leaking, bleeding, or contractions. VSS.

## 2021-01-05 ENCOUNTER — HOSPITAL ENCOUNTER (OUTPATIENT)
Facility: CLINIC | Age: 26
Setting detail: OBSERVATION
End: 2021-01-05
Admitting: OBSTETRICS & GYNECOLOGY

## 2021-01-05 ENCOUNTER — ANESTHESIA (OUTPATIENT)
Dept: OBGYN | Facility: CLINIC | Age: 26
End: 2021-01-05
Payer: MEDICAID

## 2021-01-05 VITALS
RESPIRATION RATE: 18 BRPM | OXYGEN SATURATION: 100 % | TEMPERATURE: 98.8 F | DIASTOLIC BLOOD PRESSURE: 69 MMHG | HEART RATE: 73 BPM | SYSTOLIC BLOOD PRESSURE: 122 MMHG

## 2021-01-05 PROBLEM — O34.31 CERVICAL INSUFFICIENCY DURING PREGNANCY IN FIRST TRIMESTER, ANTEPARTUM: Status: ACTIVE | Noted: 2021-01-05

## 2021-01-05 LAB
BACTERIA SPEC CULT: NORMAL
C TRACH DNA SPEC QL NAA+PROBE: NEGATIVE
Lab: NORMAL
N GONORRHOEA DNA SPEC QL NAA+PROBE: NEGATIVE
SPECIMEN SOURCE: NORMAL

## 2021-01-05 PROCEDURE — 999N000141 HC STATISTIC PRE-PROCEDURE NURSING ASSESSMENT: Performed by: OBSTETRICS & GYNECOLOGY

## 2021-01-05 PROCEDURE — 360N000074 HC SURGERY LEVEL 1, PER MIN: Performed by: OBSTETRICS & GYNECOLOGY

## 2021-01-05 PROCEDURE — G0378 HOSPITAL OBSERVATION PER HR: HCPCS

## 2021-01-05 PROCEDURE — 96360 HYDRATION IV INFUSION INIT: CPT | Mod: 59

## 2021-01-05 PROCEDURE — 250N000011 HC RX IP 250 OP 636

## 2021-01-05 PROCEDURE — 710N000010 HC RECOVERY PHASE 1, LEVEL 2, PER MIN: Performed by: OBSTETRICS & GYNECOLOGY

## 2021-01-05 PROCEDURE — 258N000003 HC RX IP 258 OP 636: Performed by: STUDENT IN AN ORGANIZED HEALTH CARE EDUCATION/TRAINING PROGRAM

## 2021-01-05 PROCEDURE — 250N000013 HC RX MED GY IP 250 OP 250 PS 637: Performed by: STUDENT IN AN ORGANIZED HEALTH CARE EDUCATION/TRAINING PROGRAM

## 2021-01-05 PROCEDURE — 250N000011 HC RX IP 250 OP 636: Performed by: STUDENT IN AN ORGANIZED HEALTH CARE EDUCATION/TRAINING PROGRAM

## 2021-01-05 PROCEDURE — 96361 HYDRATE IV INFUSION ADD-ON: CPT | Mod: 59

## 2021-01-05 PROCEDURE — 96374 THER/PROPH/DIAG INJ IV PUSH: CPT | Mod: 59

## 2021-01-05 PROCEDURE — 370N000017 HC ANESTHESIA TECHNICAL FEE, PER MIN: Performed by: OBSTETRICS & GYNECOLOGY

## 2021-01-05 PROCEDURE — G0463 HOSPITAL OUTPT CLINIC VISIT: HCPCS | Mod: 25

## 2021-01-05 PROCEDURE — 250N000013 HC RX MED GY IP 250 OP 250 PS 637

## 2021-01-05 PROCEDURE — 59320 REVISION OF CERVIX: CPT | Mod: GC | Performed by: OBSTETRICS & GYNECOLOGY

## 2021-01-05 PROCEDURE — 96361 HYDRATE IV INFUSION ADD-ON: CPT

## 2021-01-05 PROCEDURE — 272N000001 HC OR GENERAL SUPPLY STERILE: Performed by: OBSTETRICS & GYNECOLOGY

## 2021-01-05 PROCEDURE — 99221 1ST HOSP IP/OBS SF/LOW 40: CPT | Mod: 25 | Performed by: OBSTETRICS & GYNECOLOGY

## 2021-01-05 PROCEDURE — 258N000003 HC RX IP 258 OP 636

## 2021-01-05 PROCEDURE — 99207 PR NO BILLABLE SERVICE THIS VISIT: CPT | Performed by: OBSTETRICS & GYNECOLOGY

## 2021-01-05 RX ORDER — CITRIC ACID/SODIUM CITRATE 334-500MG
SOLUTION, ORAL ORAL
Status: DISCONTINUED
Start: 2021-01-05 | End: 2021-01-05 | Stop reason: HOSPADM

## 2021-01-05 RX ORDER — LABETALOL HYDROCHLORIDE 5 MG/ML
10 INJECTION, SOLUTION INTRAVENOUS
Status: DISCONTINUED | OUTPATIENT
Start: 2021-01-05 | End: 2021-01-05 | Stop reason: HOSPADM

## 2021-01-05 RX ORDER — BUPIVACAINE HYDROCHLORIDE 7.5 MG/ML
INJECTION, SOLUTION INTRASPINAL PRN
Status: DISCONTINUED | OUTPATIENT
Start: 2021-01-05 | End: 2021-01-05

## 2021-01-05 RX ORDER — NALOXONE HYDROCHLORIDE 0.4 MG/ML
0.2 INJECTION, SOLUTION INTRAMUSCULAR; INTRAVENOUS; SUBCUTANEOUS
Status: DISCONTINUED | OUTPATIENT
Start: 2021-01-05 | End: 2021-01-05 | Stop reason: HOSPADM

## 2021-01-05 RX ORDER — KETOROLAC TROMETHAMINE 30 MG/ML
INJECTION, SOLUTION INTRAMUSCULAR; INTRAVENOUS PRN
Status: DISCONTINUED | OUTPATIENT
Start: 2021-01-05 | End: 2021-01-05

## 2021-01-05 RX ORDER — NALOXONE HYDROCHLORIDE 0.4 MG/ML
0.4 INJECTION, SOLUTION INTRAMUSCULAR; INTRAVENOUS; SUBCUTANEOUS
Status: DISCONTINUED | OUTPATIENT
Start: 2021-01-05 | End: 2021-01-05 | Stop reason: HOSPADM

## 2021-01-05 RX ORDER — ONDANSETRON 2 MG/ML
4 INJECTION INTRAMUSCULAR; INTRAVENOUS EVERY 30 MIN PRN
Status: DISCONTINUED | OUTPATIENT
Start: 2021-01-05 | End: 2021-01-05 | Stop reason: HOSPADM

## 2021-01-05 RX ORDER — SODIUM CHLORIDE, SODIUM LACTATE, POTASSIUM CHLORIDE, CALCIUM CHLORIDE 600; 310; 30; 20 MG/100ML; MG/100ML; MG/100ML; MG/100ML
INJECTION, SOLUTION INTRAVENOUS CONTINUOUS
Status: DISCONTINUED | OUTPATIENT
Start: 2021-01-05 | End: 2021-01-05 | Stop reason: HOSPADM

## 2021-01-05 RX ORDER — ONDANSETRON 4 MG/1
4 TABLET, ORALLY DISINTEGRATING ORAL EVERY 30 MIN PRN
Status: DISCONTINUED | OUTPATIENT
Start: 2021-01-05 | End: 2021-01-05 | Stop reason: HOSPADM

## 2021-01-05 RX ORDER — SODIUM CHLORIDE, SODIUM LACTATE, POTASSIUM CHLORIDE, CALCIUM CHLORIDE 600; 310; 30; 20 MG/100ML; MG/100ML; MG/100ML; MG/100ML
INJECTION, SOLUTION INTRAVENOUS CONTINUOUS PRN
Status: DISCONTINUED | OUTPATIENT
Start: 2021-01-05 | End: 2021-01-05

## 2021-01-05 RX ADMIN — SODIUM CHLORIDE, POTASSIUM CHLORIDE, SODIUM LACTATE AND CALCIUM CHLORIDE: 600; 310; 30; 20 INJECTION, SOLUTION INTRAVENOUS at 08:48

## 2021-01-05 RX ADMIN — SODIUM CHLORIDE, POTASSIUM CHLORIDE, SODIUM LACTATE AND CALCIUM CHLORIDE: 600; 310; 30; 20 INJECTION, SOLUTION INTRAVENOUS at 00:00

## 2021-01-05 RX ADMIN — PHENYLEPHRINE HYDROCHLORIDE 100 MCG: 10 INJECTION INTRAVENOUS at 09:38

## 2021-01-05 RX ADMIN — SODIUM CHLORIDE, POTASSIUM CHLORIDE, SODIUM LACTATE AND CALCIUM CHLORIDE: 600; 310; 30; 20 INJECTION, SOLUTION INTRAVENOUS at 09:08

## 2021-01-05 RX ADMIN — SODIUM CITRATE AND CITRIC ACID MONOHYDRATE: 500; 334 SOLUTION ORAL at 08:45

## 2021-01-05 RX ADMIN — Medication 2 G: at 08:47

## 2021-01-05 RX ADMIN — BUPIVACAINE HYDROCHLORIDE IN DEXTROSE 1 ML: 7.5 INJECTION, SOLUTION SUBARACHNOID at 09:20

## 2021-01-05 RX ADMIN — ONDANSETRON 4 MG: 2 INJECTION INTRAMUSCULAR; INTRAVENOUS at 09:39

## 2021-01-05 RX ADMIN — INDOMETHACIN 100 MG: 50 CAPSULE ORAL at 08:47

## 2021-01-05 RX ADMIN — KETOROLAC TROMETHAMINE 30 MG: 30 INJECTION, SOLUTION INTRAMUSCULAR at 10:00

## 2021-01-05 ASSESSMENT — ACTIVITIES OF DAILY LIVING (ADL)
DOING_ERRANDS_INDEPENDENTLY_DIFFICULTY: NO
TOILETING_ISSUES: NO
DRESSING/BATHING_DIFFICULTY: NO
HEARING_DIFFICULTY_OR_DEAF: NO
FALL_HISTORY_WITHIN_LAST_SIX_MONTHS: NO
WEAR_GLASSES_OR_BLIND: NO
CONCENTRATING,_REMEMBERING_OR_MAKING_DECISIONS_DIFFICULTY: NO
DIFFICULTY_EATING/SWALLOWING: NO
WALKING_OR_CLIMBING_STAIRS_DIFFICULTY: NO
DIFFICULTY_COMMUNICATING: NO

## 2021-01-05 NOTE — DISCHARGE SUMMARY
[facility] Discharge Summary    Cassie Duarte MRN# [id number]   Age: 25 year old YOB: 1995     Date of Admission:  2021  Date of Discharge:  2021  Admitting Physician:  Al Madrigal MD  Discharge Physician:  Al Madrigal MD     Admission Diagnosis:  - IUP at 21w1d  - H/o CCS at 27w3d for  labor, breech  - Short interval pregnancy (last delivery 2020)  - History of THC use  - Child with VSD    Discharge Diagnosis:  - Same, s/p procedure below at 21w2d    Procedures:    - Cervical cerclage placement  - Spinal anesthesia    Consultations:    - Anesthesia    Medications prior to admission:  Medications Prior to Admission   Medication Sig Dispense Refill Last Dose     Prenatal Vit-Fe Fumarate-FA (PRENATAL MULTIVITAMIN W/IRON) 27-0.8 MG tablet Take 1 tablet by mouth daily   1/3/2021 at Unknown time     ferrous sulfate (FEROSUL) 325 (65 Fe) MG tablet Take 1 tablet (325 mg) by mouth 2 times daily (Patient not taking: Reported on 2020) 60 tablet 3        Brief History of Presentation:    Ms. Cassie Duarte is a 25 year old  at 21w1d by LMP c/w 9w1d US, who presents after US-findings of shortened cervix with funneling. Patient has a history of  birth at 27w3d via CCS for  labor, breech. She has been followed with serial cervical lengths. On US , cervix appeared long and closed. Today on US, there is no measurable closed cervix with funneling of membranes to the level of the external os. She was sent to [facility] for possible cerclage evaluation. Currently, she denies contractions, vaginal bleeding, and loss of fluid. Active fetal movement. No additional complaints.    Hospital Course:    On admission, SSE revealed possible FT dilated cervix with no visible membranes. Infectious workup was negative. Management options were discussed. She elected for cerclage placement. Consent was obtained. She was made NPO on HD#1. She  underwent Ortiz cervical cerclage on HD#2 without complication. She received pre-operative Ancef and indocin. At time of discharge, she was ambulating without issue, voiding, tolerating PO. Her pain/cramping was minimal and appropriate. She was discharged to home on HD #2/POD 0.    Discharge Instructions:  Call or present to labor and delivery if you experience:   -Regular painful contractions concerning for labor   -Leakage of fluid concerning for ruptured membranes   -Decreased fetal movement   -Bright red vaginal bleeding    -Headache, vision changes, upper abdominal pain, significant increase in swelling,   generalized unwell feeling    Follow up:  Follow up with primary OB     Discharge Medications:     Review of your medicines      UNREVIEWED medicines. Ask your doctor about these medicines      Dose / Directions   ferrous sulfate 325 (65 Fe) MG tablet  Commonly known as: FEROSUL  Used for: Encounter for supervision pregnancy in primigravida, antepartum, Late prenatal care, Iron deficiency anemia secondary to inadequate dietary iron intake      Dose: 325 mg  Take 1 tablet (325 mg) by mouth 2 times daily  Quantity: 60 tablet  Refills: 3     prenatal multivitamin w/iron 27-0.8 MG tablet      Dose: 1 tablet  Take 1 tablet by mouth daily  Refills: 0            Zeeshan Juan MD  Maternal-Fetal Medicine Fellow, PGY-5      Physician Attestation   I, Al Madrigal MD, saw this patient with the resident/fellow and agree with the resident s findings and plan of care as documented in the resident s note.      I personally reviewed vital signs.    Key findings: postoperative from cervical cerclage placement for ultrasound indicated/physical exam indicated cerclage (was dilated at time of placement with membranes visible at ext os).    Al Madrigal  Date of Service (when I saw the patient): 01/05/21    Time Spent on this Encounter   Al URIBE, spent a total of 10 minutes bedside and on the  inpatient unit today managing the care of Cassie Duarte.  Over 50% of my time on the unit was spent counseling the patient and /or coordinating care regarding discharge. See note for details.

## 2021-01-05 NOTE — DISCHARGE INSTRUCTIONS
Discharge Instruction for Undelivered Patients      You were seen for: cerclage  We Consulted: Dr. Madrigal  You had (Test or Medicine):cerclage     Diet:   Drink 8 to 12 glasses of liquids (milk, juice, water) every day.  You may eat meals and snacks.     Activity:  Rest the pelvic area. No sex. Do not stimulate breasts or nipples.     Call your provider if you notice:  Swelling in your face or increased swelling in your hands or legs.  Headaches that are not relieved by Tylenol (acetaminophen).  Changes in your vision (blurring: seeing spots or stars.)  Nausea (sick to your stomach) and vomiting (throwing up).   Weight gain of 5 pounds or more per week.  Heartburn that doesn't go away.  Signs of bladder infection: pain when you urinate (use the toilet), need to go more often and more urgently.  The bag of ha (rupture of membranes) breaks, or you notice leaking in your underwear.  Bright red blood in your underwear.  Abdominal (lower belly) or stomach pain.  *If less than 34 weeks: Contractions (tightenings) more than 6 times in one hour.  Increase or change in vaginal discharge (note the color and amount)      Follow-up:  As scheduled in the clinic

## 2021-01-05 NOTE — PLAN OF CARE
Patient discharged following cerclage this morning. Patient has been able to eat, void and ambulate. Denies pain. Cramping improving. Discharge instructions given; all questions answered. Discharge home at 1654.

## 2021-01-05 NOTE — ANESTHESIA PROCEDURE NOTES
Spinal/LP Procedure Note    Spinal Block      Staff -   Anesthesiologist:  Regina Bell MD  CRNA: Reji Bahena MD  Performed By: resident  Location: OB  Procedure Start/Stop Times:     patient identified, IV checked, site marked, risks and benefits discussed, informed consent, monitors and equipment checked, pre-op evaluation, at physician/surgeon's request and post-op pain management      Correct Patient: Yes      Correct Position: Yes      Correct Site: Yes      Correct Procedure: Yes      Correct Laterality:  Yes    Site Marked:  Yes  Procedure:     Procedure:  Intrathecal    ASA:  2    Position:  Sitting    Sterile Prep: chloraprep, mask, sterile gloves and patient draped      Insertion site:  L3-4    Approach:  Midline    Needle Type:  Pencan    Needle gauge (G):  25    Local Skin Infiltration:  1% lidocaine    amount (ml):  3    Needle Length (in):  3.5    Introducer used: Yes      Introducer gauge:  20 G    Attempts:  2    Redirects:  0    CSF:  Clear    Paresthesias:  No  Assessment/Narrative:     Sensory Level:  T10

## 2021-01-05 NOTE — ANESTHESIA PREPROCEDURE EVALUATION
"Anesthesia Pre-Procedure Evaluation    Patient: Cassie Duarte   MRN:     [id number] Gender:   female   Age:    25 year old :      1995        Preoperative Diagnosis: * No pre-op diagnosis entered *   Procedure(s):  CERCLAGE, CERVIX, VAGINAL APPROACH     LABS:  CBC:   Lab Results   Component Value Date    WBC 6.8 2021    WBC 5.6 10/15/2020    HGB 9.9 (L) 2021    HGB 11.3 (L) 10/15/2020    HCT 28.2 (L) 2021    HCT 31.8 (L) 10/15/2020     2021     10/15/2020     BMP: No results found for: NA, POTASSIUM, CHLORIDE, CO2, BUN, CR, GLC  COAGS: No results found for: PTT, INR, FIBR  POC:   Lab Results   Component Value Date    HCG Positive (A) 2019     OTHER: No results found for: PH, LACT, A1C, AUSTIN, PHOS, MAG, ALBUMIN, PROTTOTAL, ALT, AST, GGT, ALKPHOS, BILITOTAL, BILIDIRECT, LIPASE, AMYLASE, ISABEL, TSH, T4, T3, CRP, SED     Preop Vitals    BP Readings from Last 3 Encounters:   21 114/73   20 110/76   10/09/20 121/81    Pulse Readings from Last 3 Encounters:   20 96   10/09/20 89   20 89      Resp Readings from Last 3 Encounters:   21 16   12 17    SpO2 Readings from Last 3 Encounters:   10/09/20 98%   20 100%   12 97%      Temp Readings from Last 1 Encounters:   21 37.1  C (98.7  F) (Oral)    Ht Readings from Last 1 Encounters:   12 1.575 m (5' 2\") (18 %, Z= -0.90)*     * Growth percentiles are based on CDC (Girls, 2-20 Years) data.      Wt Readings from Last 1 Encounters:   20 79.9 kg (176 lb 4 oz)    Estimated body mass index is 32.24 kg/m  as calculated from the following:    Height as of 12: 1.575 m (5' 2\").    Weight as of 20: 79.9 kg (176 lb 4 oz).     LDA:        Past Medical History:   Diagnosis Date     Known health problems: none       Past Surgical History:   Procedure Laterality Date      SECTION      classical      Allergies   Allergen Reactions     Cats      Long haired " female        Anesthesia Evaluation     .             ROS/MED HX    ENT/Pulmonary:  - neg pulmonary ROS     Neurologic:  - neg neurologic ROS     Cardiovascular:  - neg cardiovascular ROS       METS/Exercise Tolerance:  >4 METS   Hematologic: Comments: :  plt 200  Hgb:9.9        Musculoskeletal:  - neg musculoskeletal ROS       GI/Hepatic:  - neg GI/hepatic ROS       Renal/Genitourinary: Comment: - IUP at 21w1d  - H/o CCS at 27w3d for  labor, breech  - Short interval pregnancy (last delivery 2020)  - shirt cervix        Endo:  - neg endo ROS       Psychiatric: Comment: - History of THC use  - Very anxious about procedure, states she couldn't help but thrash about during last c/s.         Infectious Disease:  - neg infectious disease ROS       Malignancy:      - no malignancy   Other:                         PHYSICAL EXAM:   Mental Status/Neuro: A/A/O   Airway: Facies: Feasible  Mallampati: I  Mouth/Opening: Full  TM distance: > 6 cm  Neck ROM: Full   Respiratory: Auscultation: CTAB     Resp. Rate: Normal     Resp. Effort: Normal      CV: Rhythm: Regular  Rate: Age appropriate  Heart: Normal Sounds  Edema: None   Comments:      Dental: Normal Dentition                Assessment:   ASA SCORE: 2    H&P: History and physical reviewed and following examination; no interval change.   Smoking Status:  Non-Smoker/Unknown   NPO Status: ELEVATED Aspiration Risk/Unknown     Plan:   Anes. Type:  Spinal   Pre-Medication: None   Induction:  IV (RSI)   Airway: Native Airway   Access/Monitoring: PIV   Maintenance: N/a     Postop Plan:   Postop Pain: Regional  Postop Sedation/Airway: Not planned  Disposition: Inpatient/Admit     PONV Management:   Adult Risk Factors: Female, Non-Smoker   Prevention: Ondansetron                   Clemencia Lovell MD

## 2021-01-05 NOTE — ANESTHESIA POSTPROCEDURE EVALUATION
Anesthesia POST Procedure Evaluation    Patient: Cassie Duarte   MRN:     [id number] Gender:   female   Age:    25 year old :      1995        Preoperative Diagnosis: * No pre-op diagnosis entered *   Procedure(s):  CERCLAGE, CERVIX, VAGINAL APPROACH   Postop Comments: No value filed.     Anesthesia Type: Spinal          Postop Pain Control: Uneventful            Sign Out: Well controlled pain   PONV: No   Neuro/Psych: Uneventful            Sign Out: Acceptable/Baseline neuro status   Airway/Respiratory: Uneventful            Sign Out: Acceptable/Baseline resp. status   CV/Hemodynamics: Uneventful            Sign Out: Acceptable CV status   Other NRE: NONE   DID A NON-ROUTINE EVENT OCCUR? No         Last Anesthesia Record Vitals:  CRNA VITALS  2021 0935 - 2021 1035      2021             NIBP:  113/67    Pulse:  82    NIBP Mean:  83    SpO2:  100 %          Last PACU Vitals:  Vitals Value Taken Time   /85 21 1115   Temp 36.6  C (97.8  F) 21 1010   Pulse 70 21 1118   Resp 12 21 1118   SpO2 100 % 21 1118   Temp src     NIBP     Pulse     SpO2     Resp     Temp     Ht Rate     Temp 2     Vitals shown include unvalidated device data.      Electronically Signed By: Regina Bell MD, 2021, 11:19 AM

## 2021-01-05 NOTE — UTILIZATION REVIEW
"Admission Status; Secondary Review Determination     Under the authority of the Utilization Management Committee, the utilization review process indicated a secondary review on the above patient.  The review outcome is based on review of the medical records, discussions with staff, and applying clinical experience noted on the date of the review.       (x) Observation Status Appropriate - This patient does not meet hospital inpatient criteria and is placed in observation status. If this patient's primary payer is Medicare and was admitted as an inpatient, Condition Code 44 should be used and patient status changed to \"observation\".     RATIONALE FOR DETERMINATION: 25-year-old -1-0-1 at 21 weeks 1 day presents after ultrasound findings of shortened cervix with funneling.  Patient has prior history of  birth at 27 weeks 3 days for  labor, breech.  Observation care appropriate to monitor and evaluate for etiology of shortened cervical length as well as planned treatment with cervical cerclage.  Case reviewed with attending physician.    The severity of illness, intensity of service provided, expected LOS and risk for adverse outcome make the care appropriate for further observation; however, doesn't meet criteria for hospital inpatient admission. This was discussed with attending physician who concurred with this determination.    The information on this document is developed by the utilization review team in order for the business office to ensure compliance.  This only denotes the appropriateness of proper admission status and does not reflect the quality of care rendered.         The definitions of Inpatient Status and Observation Status used in making the determination above are those provided in the CMS Coverage Manual, Chapter 1 and Chapter 6, section 70.4.      Sincerely,     Roman Gallardo MD    Physician Advisor  Utilization Review/ Case Management  [facility].    "

## 2021-01-05 NOTE — OP NOTE
Operative Note: Cervical Cerclage         Pre-Op Diagnosis:   1) Single intrauterine pregnancy at 21w2d by LMP c/w 9w1d US  2) History of  delivery, shortened cervix         Post-Op Diagnosis:   1) Same         Procedure:   1) Ortiz cervical cerclage, single sutures (knot at 12 o'clock position)         Surgeons:   Attending: Al Madrigal MD  Fellow: Zeeshan Juan MD, M Fellow         Anesthesia:   Spinal          Estimated Blood Loss:   15 mL         Findings:   1) Cervix visually 1 cm dilated prior to the procedure with exposure of amniotic sac, closed following the procedure  2) Fetal heart tones confirmed by doptone following the procedure         Specimens:   1) None         Complications:   1) None apparent          History:     Cassie Duarte is a 25 year old  at 21w2d by LMP c/w 9w1d US. She has a history of  labor with delivery at 27 weeks and thus underwent routine cervical length surveillance. On 2021, she was found to have evidence of cervical shortening with no visualization of measurable cervix.  After counseling regarding these findings, the patient has decided to proceed with cervical cerclage.         Details of Procedure:   After administration of spinal anesthesia the patient was placed in the dorsal lithotomy position and prepped and draped in the usual fashion. A surgical time-out was performed.  A weighted speculum was placed into the vagina and Breiske retractors were used to visualize the cervix. The vagina and cervix were copiously cleansed with betadine solution.  The anterior and posteriors lips of the cervix were grasped with ring forceps.  A circumferential suture of #2 Ethilon was placed in the usual fashion at the cervicovaginal reflection with knot tied at 12 o'clock position.   The suture was securely tied down and digital exam confirmed that the cervix was closed.    The bladder was drained of clear urine and a rectal exam confirmed that no sutures  were present in the rectum.  The cervix and vaginal vault were inspected and noted to be free of injury and hemostatic.      The instruments were removed from the vagina. She tolerated her spinal anesthesia well without incident. She was subsequently transferred to the recovery room in satisfactory condition.    Sponge and needle counts were correct at the close of the case x 2.    Zeeshan Juan MD  Maternal Fetal Medicine Fellow  1/5/2021 10:23 AM

## 2021-01-05 NOTE — PROGRESS NOTES
MFM Post-operative Check    S: Patient states that she is doing well overall. Denies any abdominal cramping or contractions. She noted a strange pulling sensation when she first got up, but it quickly resolved. She notes some mild discomfort, rated 1/10 in severity. Denies nausea or emesis and is tolerating PO. Ambulating and voiding without difficulty.     O:  Vitals:    21 1115 21 1137 21 1226 21 1323   BP: 131/85 124/81 119/77 137/87   Pulse: 73      Resp:  18 18 18   Temp:       TempSrc:       SpO2: 100% 100% 100% 100%     Gen Appear: NAD  CV: RRR  Pulm:  CTAB  Abdomen: gravid, soft, nontender to palpation  Extrem: No LE edema or calf tenderness     post-operatively     A/P: 25 year old  at 21w2d who is POD 0 s/p uncomplicated cervical cerclage and doing well.    - Following routine post-operative milestones  - Ambulating, voiding, tolerating PO, pain controlled  - Fetal heart tones normal post-operatively  - Return precautions reviewed  - May take OTC tylenol PRN as directed for pain  - Patient to follow-up with OBGYN provider for routine prenatal care    Discussed with Dr. Madrigal.    Zeeshan Juan MD  Maternal-Fetal Medicine Fellow, PGY-5

## 2021-01-05 NOTE — PLAN OF CARE
AM Cerclage Note  Cassie Duarte                                 Primary Care Provider: Rohan  MRN: [id number]     Gestational Age: 21w2d        Cassie Duarte presents for cerclage placement due to shortened cervix with funneling on ultrasound.  Patient denies contractions, bleeding or LOF.    Past Medical History:   Diagnosis Date     Known health problems: none      Past Surgical History:   Procedure Laterality Date      SECTION      classical         FHT: see flowsheet    Appropriate for Gestational Age    Plan:   -IV fluid started and NPO at 0000.  -Cerclage in OR  @ 9:00.

## 2021-01-05 NOTE — PLAN OF CARE
VSS. Pt denies feeling any contractions or cramping. Plan to have cerclage at 0830 1/5/21. Pt to be NPO at midnight. Support person, Bernard, at bedside. Will continue with plan of care.

## 2021-01-05 NOTE — H&P
[facility]  Antepartum History and Physical      Cassie Duarte MRN# [id number]   Age: 25 year old YOB: 1995     CC:    Short cervix    HPI:  Ms. Cassie Duarte is a 25 year old  at 21w1d by LMP c/w 9w1d US, who presents after US-findings of shortened cervix with funneling. Patient has a history of  birth at 27w3d via CCS for  labor, breech. She has been followed with serial cervical lengths. On US , cervix appeared long and closed. Today on US, there is no measurable closed cervix with funneling of membranes to the level of the external os. She was sent to [facility] for possible cerclage evaluation. Currently, she denies contractions, vaginal bleeding, and loss of fluid. Active fetal movement. No additional complaints.    Pregnancy Complications:  - H/o CCS at 27w3d for  labor, breech  - Short interval pregnancy (last delivery 2020)  - History of THC use  - Child with VSD    Prenatal Labs:   Lab Results   Component Value Date    ABO PENDING 2021    RH Pos 10/15/2020    AS PENDING 2021    HEPBANG Nonreactive 10/15/2020    CHPCRT Negative 12/10/2019    GCPCRT Negative 12/10/2019    HGB 9.9 (L) 2021     GBS Status:   No results found for: GBS    Ultrasounds  21  Impression  =========  1) Wilson intrauterine pregnancy at 21 & 1/7 weeks gestational age.  2) None of the anomalies commonly detected by ultrasound were evident in the fetal anatomic survey as described above, specifically views that were previously suboptimal  appear normal today.  3) Biometry was performed 2020 and was not repeated today.  4) The amniotic fluid volume appeared normal.  5) Normal fetal activity for gestational age.  6) On transvaginal imaging the cervix appears funneled from the internal os to the level of the external os. There is essentially no measurable closed cervix with the external  os being approximately fingertip  dilated.    OB History  OB History    Para Term  AB Living   2 1 0 1 0 1   SAB TAB Ectopic Multiple Live Births   0 0 0 0 1      # Outcome Date GA Lbr David/2nd Weight Sex Delivery Anes PTL Lv   2 Current            1  20 27w3d  1.077 kg (2 lb 6 oz) F CS-Classical Spinal  RENITA      Complications:  labor, [place] breech presentation      Name: Lora       PMHx:   Past Medical History:   Diagnosis Date     Known health problems: none      PSHx:   Past Surgical History:   Procedure Laterality Date      SECTION      classical     Meds:   Medications Prior to Admission   Medication Sig Dispense Refill Last Dose     Prenatal Vit-Fe Fumarate-FA (PRENATAL MULTIVITAMIN W/IRON) 27-0.8 MG tablet Take 1 tablet by mouth daily   1/3/2021 at Unknown time     ferrous sulfate (FEROSUL) 325 (65 Fe) MG tablet Take 1 tablet (325 mg) by mouth 2 times daily (Patient not taking: Reported on 2020) 60 tablet 3      Allergies:    Allergies   Allergen Reactions     Cats      Long haired female      FmHx:   Family History   Problem Relation Age of Onset     No Known Problems Mother      No Known Problems Father      No Known Problems Sister      Cardiovascular Maternal Grandmother      Liver Cancer Maternal Grandfather      Cancer Paternal Grandmother      Lymphoma Sister 24     Congenital heart disease Daughter         vsd     SocHx:   She denies any tobacco, alcohol, or other drug use during this pregnancy.    ROS:     Complete 10-point ROS negative except as noted in HPI.    PE:  Vit:   Patient Vitals for the past 4 hrs:   BP Temp Temp src Resp   21 1730 118/69 98.6  F (37  C) Oral 16      Gen: Well-appearing, NAD, comfortable   CV: Well perfused  Pulm: Non-labored breathing  Abd: Soft, gravid, non-tender  Ext: Trace LE edema b/l    SSE:  Cervix possibly FT to 1 cm dilated, though collapsed. Appears 1.5-2 cm long. No visible membranes. Cervical ectropion. Mild amount of white discharge  in the vault. No blood or pooling.    Pres:  Ceph by US   Memb: Intact              Doppler tones: 140s  [place]:Quiet    Assessment/Plan  Cassie Duarte is a 25 year old  female at 21w1d by LMP c/w 9w1d US, here for evaluation of shortened cervical length with funneling on US. Her pregnancy has otherwise complicated by: history of CCS at 27w for  labor/breech, short interval pregnancy.     #Shortened cervix, funneling of membranes  - Evaluation for infectious etiology leading to shortened cervix, including T&S, CBC, wet prep, GC/CT, UA, UDS.  - Tocometry quiet. Will remove for now. Replace if cramping/mick.   - US was again reviewed. Discussed various etiologies for cervical shortening, including  contractions,  rupture of membranes, and cervical insufficiency. We reviewed options of expectant management versus cerclage placement. Patient desires cerclage placement.  - Discussed risks of a cerclage including but not limited to: bleeding (including placental), infection (including chorioamnionitis), damage to surrounding structures including but not limited to bowel, bladder, cervix, risk of PPROM, failure of cerclage to prevent pre-viable, frederick-viable or  birth, possible increased need for  delivery. Signed consent obtained.  - Doptones before and after the procedure.  - Anesthesia consulted. NPO w/mIVF at 0000. Plan for procedure at 0900 on .   - Preop Ancef and indocin ordered.    The patient was discussed with Dr. Madrigal who is in agreement with the treatment plan.    Deena Sierra MD  OB/GYN Resident, PGY-3  2021 8:37 PM

## 2021-01-05 NOTE — ANESTHESIA CARE TRANSFER NOTE
Patient: Cassie Duarte    Procedure(s):  CERCLAGE, CERVIX, VAGINAL APPROACH    Diagnosis: * No pre-op diagnosis entered *  Diagnosis Additional Information: No value filed.    Anesthesia Type:   Spinal     Note:    Patient transferred to:PACU  Comments: Patient arrives to PACU hemodynamically stable, denies PONV, and has minimal post op pain as expected due to spinal blockade.   Handoff Report: Identifed the Patient, Identified the Reponsible Provider, Reviewed the pertinent medical history, Discussed the surgical course, Reviewed Intra-OP anesthesia mangement and issues during anesthesia, Set expectations for post-procedure period and Allowed opportunity for questions and acknowledgement of understanding      Vitals: (Last set prior to Anesthesia Care Transfer)    CRNA VITALS  1/5/2021 0935 - 1/5/2021 1015      1/5/2021             NIBP:  113/67    Pulse:  82    NIBP Mean:  83    SpO2:  100 %                Electronically Signed By: Reji Bahena MD  January 5, 2021  10:15 AM

## 2021-01-05 NOTE — OR NURSING
Patient had cervical cerclage at 0942. Patient recovering well in PACU, denies any pain at this time. Plan per provider is to discharge patient to home once she can void, eat and ambulate.

## 2021-01-18 ENCOUNTER — TRANSFERRED RECORDS (OUTPATIENT)
Dept: HEALTH INFORMATION MANAGEMENT | Facility: CLINIC | Age: 26
End: 2021-01-18

## 2021-01-25 ENCOUNTER — TRANSFERRED RECORDS (OUTPATIENT)
Dept: HEALTH INFORMATION MANAGEMENT | Facility: CLINIC | Age: 26
End: 2021-01-25

## 2021-03-01 ENCOUNTER — TRANSFERRED RECORDS (OUTPATIENT)
Dept: HEALTH INFORMATION MANAGEMENT | Facility: CLINIC | Age: 26
End: 2021-03-01

## 2021-03-26 ENCOUNTER — OFFICE VISIT (OUTPATIENT)
Dept: OBGYN | Facility: CLINIC | Age: 26
End: 2021-03-26

## 2021-03-26 VITALS
BODY MASS INDEX: 35.96 KG/M2 | WEIGHT: 196.6 LBS | HEART RATE: 99 BPM | SYSTOLIC BLOOD PRESSURE: 128 MMHG | DIASTOLIC BLOOD PRESSURE: 76 MMHG | OXYGEN SATURATION: 100 %

## 2021-03-26 DIAGNOSIS — O09.893 SUPERVISION OF OTHER HIGH RISK PREGNANCIES, THIRD TRIMESTER: Primary | ICD-10-CM

## 2021-03-26 DIAGNOSIS — Z98.891 HISTORY OF CLASSICAL CESAREAN SECTION: ICD-10-CM

## 2021-03-26 PROCEDURE — 99207 PR PRENATAL VISIT: CPT | Performed by: OBSTETRICS & GYNECOLOGY

## 2021-03-26 ASSESSMENT — PAIN SCALES - GENERAL: PAINLEVEL: NO PAIN (0)

## 2021-03-26 NOTE — PROGRESS NOTES
She reports feeling reassuring daily fetal activity and will continue to record.  She gained 1 lb since her last visit and denies any fluid leakage or regular uterine contractions.  She has her next MF visit on Monday, 3/29/2021 for fetal growth assessment.  The plan is to remove her Ortiz cerclage at 36 weeks gestation.  Due to her hx of a primary classical C/S at 27 3/7 weeks gestation for a double footling breech, a repeat C/S is planned for delivery and [facility] is to recommend the gestation age for delivery.  She does NOT want a PPTL since she is hoping to have another pregnancy but in 3-4 years.

## 2021-03-26 NOTE — PATIENT INSTRUCTIONS
If you have any questions regarding your visit, Please contact your care team.    [facility]: [phone]      [facility] CLINIC HOURS TELEPHONE NUMBER   Ghada Rosas DO.    Domitila -  Dhara -     ORACIO Jones RN     Monday, Wednesday, [facility] and [facility]  8:30a.m-5:00 p.m   [facility]  [address] [place] [address]  [phone] ask for [facility]    Imaging [phone]       Urgent Care locations:    [facility] Saturday and Sunday   9 am - 5 pm    Monday-Friday   11 pm - 7 pm  Saturday and Sunday   9 am - 5 pm   [phone]     [facility] Labor and Delivery:  [phone]    If you need a medication refill, please contact your pharmacy. Please allow 3 business days for your refill to be completed.  As always, Thank you for trusting us with your healthcare needs!

## 2021-03-29 ENCOUNTER — TELEPHONE (OUTPATIENT)
Dept: OBGYN | Facility: CLINIC | Age: 26
End: 2021-03-29

## 2021-03-29 DIAGNOSIS — O34.33 CERVICAL CERCLAGE SUTURE PRESENT IN THIRD TRIMESTER: ICD-10-CM

## 2021-03-29 DIAGNOSIS — O34.33 CERVICAL INSUFFICIENCY DURING PREGNANCY IN THIRD TRIMESTER, ANTEPARTUM: Primary | ICD-10-CM

## 2021-03-29 NOTE — TELEPHONE ENCOUNTER
Ghada Rosas, DO  You 4 minutes ago (4:19 PM)     Please let this pt know that the US order has been placed so now she needs to schedule this here.    Message text      Unable to reach patient via phone. RN left a message and instructed patient to call the clinic at [phone].    Stacey Chávez RN on 3/29/2021 at 4:27 PM

## 2021-03-29 NOTE — TELEPHONE ENCOUNTER
RN called and spoke with patient, relaying provider notes. Patient verbalized understanding and had no further questions.  Gave number to schedule her ultrasound at [phone].    HOWIE MelaraN RN

## 2021-03-29 NOTE — TELEPHONE ENCOUNTER
Pt currently 33w1d, last seen 3/26/2021.    Received call from [facility]. [facility] states pt in need of growth US within next 1-2 weeks. Rn routing to provider for advisement on orders.    [facility] also advises for cerclage to wait to be removed until time of delivery. [facility] recommending delivery between 36-37 weeks.    Stacey Chávez RN on 3/29/2021 at 2:30 PM

## 2021-04-05 ENCOUNTER — ANCILLARY PROCEDURE (OUTPATIENT)
Dept: ULTRASOUND IMAGING | Facility: CLINIC | Age: 26
End: 2021-04-05
Attending: OBSTETRICS & GYNECOLOGY
Payer: MEDICAID

## 2021-04-05 DIAGNOSIS — O34.33 CERVICAL CERCLAGE SUTURE PRESENT IN THIRD TRIMESTER: ICD-10-CM

## 2021-04-05 DIAGNOSIS — O34.33 CERVICAL INSUFFICIENCY DURING PREGNANCY IN THIRD TRIMESTER, ANTEPARTUM: ICD-10-CM

## 2021-04-05 PROCEDURE — 76816 OB US FOLLOW-UP PER FETUS: CPT | Performed by: RADIOLOGY

## 2021-04-12 ENCOUNTER — PRENATAL OFFICE VISIT (OUTPATIENT)
Dept: OBGYN | Facility: CLINIC | Age: 26
End: 2021-04-12

## 2021-04-12 VITALS
WEIGHT: 200.9 LBS | BODY MASS INDEX: 36.75 KG/M2 | SYSTOLIC BLOOD PRESSURE: 128 MMHG | HEART RATE: 108 BPM | DIASTOLIC BLOOD PRESSURE: 82 MMHG

## 2021-04-12 DIAGNOSIS — O09.899 SUPERVISION OF OTHER HIGH RISK PREGNANCY, ANTEPARTUM: ICD-10-CM

## 2021-04-12 PROBLEM — Z23 NEED FOR TDAP VACCINATION: Status: RESOLVED | Noted: 2020-12-04 | Resolved: 2021-04-12

## 2021-04-12 PROBLEM — O26.879 SHORT CERVIX AFFECTING PREGNANCY: Status: RESOLVED | Noted: 2021-01-04 | Resolved: 2021-04-12

## 2021-04-12 PROCEDURE — 99207 PR PRENATAL VISIT: CPT | Performed by: OBSTETRICS & GYNECOLOGY

## 2021-04-12 SDOH — ECONOMIC STABILITY: TRANSPORTATION INSECURITY
IN THE PAST 12 MONTHS, HAS THE LACK OF TRANSPORTATION KEPT YOU FROM MEDICAL APPOINTMENTS OR FROM GETTING MEDICATIONS?: NOT ASKED

## 2021-04-12 SDOH — ECONOMIC STABILITY: FOOD INSECURITY: WITHIN THE PAST 12 MONTHS, THE FOOD YOU BOUGHT JUST DIDN'T LAST AND YOU DIDN'T HAVE MONEY TO GET MORE.: NOT ASKED

## 2021-04-12 SDOH — ECONOMIC STABILITY: FOOD INSECURITY: WITHIN THE PAST 12 MONTHS, YOU WORRIED THAT YOUR FOOD WOULD RUN OUT BEFORE YOU GOT MONEY TO BUY MORE.: NOT ASKED

## 2021-04-12 SDOH — ECONOMIC STABILITY: TRANSPORTATION INSECURITY
IN THE PAST 12 MONTHS, HAS LACK OF TRANSPORTATION KEPT YOU FROM MEETINGS, WORK, OR FROM GETTING THINGS NEEDED FOR DAILY LIVING?: NOT ASKED

## 2021-04-12 SDOH — ECONOMIC STABILITY: INCOME INSECURITY: HOW HARD IS IT FOR YOU TO PAY FOR THE VERY BASICS LIKE FOOD, HOUSING, MEDICAL CARE, AND HEATING?: NOT ASKED

## 2021-04-12 NOTE — PATIENT INSTRUCTIONS
If you have any questions regarding your visit, Please contact your care team.     [email or web address]: [phone]  [facility] CLINIC HOURS TELEPHONE NUMBER       Sunny Araya M.D.    Stacey-ORACIO Jones-ORACIO Leo-Medical Assistant    Domitila-  Dhara-     Monday-[place]  8:00a.m-4:45 p.m  Tuesday-[place]  9:00a.m-4:00 p.m  Wednesday-[place] 8:00a.m-4:45 p.m.  Thursday-[place]  8:00a.m-4:45 p.m.  Friday-[place]  8:00a.m-4:45 p.m. [facility]  [id number] [place] [id number] [phone] ask for [facility]  [phone] Fax  Imaging [phone]    [facility] Labor and Delivery  [address]  [place], [place] [address] [phone]    [facility]  [address] [place]  [place] [address] [phone] ask [facility]  [phone] Fax  Imaging [phone]     Urgent Care locations:    [place]        [place] Monday-Friday  5 pm - 9 pm  Saturday and Sunday   9 am - 5 pm  Monday-Friday   11 am - 9 pm  Saturday and Sunday   9 am - 5 pm   [phone]   If you need a medication refill, please contact your pharmacy. Please allow 3 business days for your refill to be completed.  As always, Thank you for trusting us with your healthcare needs!

## 2021-04-12 NOTE — Clinical Note
Patient is ready to schedule cerclage removal and repeat  section at 36-37 weeks (next week) with Dr. Rosas. History of classical  section and cerclage in place.

## 2021-04-12 NOTE — PROGRESS NOTES
No significant signs, symptoms or concerns. She is applying for medical insurance. Cerclage removal and repeat  section is advised at 36-37 weeks and will be scheduled. Discussed operation, risks, benefits,and alternatives. Preop instructions given.    Counseling included the following: Advice appropriate to gestational age reviewed including pertinent Checklist items. Discussed condition, tests and care plan including risks, benefits, and alternatives. Problem list updated.   20 minutes spent on the date of encounter doing chart review, history, examination, discussion with patient, and documentation, and further activities as noted, and review of appropriateness of decision-making for care    A/P:  Cassie was seen today for prenatal care.    Diagnoses and all orders for this visit:    Supervision of other high risk pregnancy, antepartum        Sunny Araya MD

## 2021-04-15 ENCOUNTER — TELEPHONE (OUTPATIENT)
Dept: OBGYN | Facility: CLINIC | Age: 26
End: 2021-04-15

## 2021-04-15 DIAGNOSIS — O09.893 SUPERVISION OF OTHER HIGH RISK PREGNANCIES, THIRD TRIMESTER: ICD-10-CM

## 2021-04-15 DIAGNOSIS — Z98.891 HISTORY OF CLASSICAL CESAREAN SECTION: Primary | ICD-10-CM

## 2021-04-15 NOTE — TELEPHONE ENCOUNTER
04/15/21 1326 Sign AlisonGhada, DO    Associated Diagnoses    History of classical  section [Z98.891]  - Primary       Supervision of other high risk pregnancies, third trimester [O09.893]       Source Order Set    Order Set Name Order ID    [id number]   Comments    There is no height or weight on file to calculate BMI.           Detailed Information    Priority and Order Details           Order Questions    Question Answer Comment   Procedure name(s) - multi select Cerclage removal and repeat  section at 36-37 weeks gestation    Reason for procedure History of classical  and presence of cerclage    Surgeon: Alison    Is this a multi surgeon case? No    Laterality N/A    Request for additional equipment Other (see comments) None   Anesthesia Spinal    Positioning (if different from preference card): Supine    Is the patient known to have any of the following? None of the above    Initiate Pre-op orders for above procedure: Yes, as ordered in Epic additional orders noted there also   Location of Case: [facility]    Operating room  requested: Yes    Urgency of Surgery: Routine    Surgeon Procedure Time (incision to closure) in minutes (per procedure as applicable) 90    Note:  Surgical Case Time Needed (in minutes)   Surgery Date: < 39 weeks (note in comments dx to support <39 week delivery reason) MFM advised at 36-37 weeks gestation and pt's EDC is 2021    Patient Class (for admit prior to surgery, specify number of days in comments): Surgery admit admit day of surgery   Length of Stay: 3 days    H&P To Be Completed By: Surgeon     needed? No    Post-Op Appointment 6 weeks    Vendor Needed?       Surgery Pre-Certification    Medical Record Number: [id number]  Cassie A Duarte  YOB: 1995   Phone: [phone] (home)   Primary Provider: Es Chong    Reason for Admit:  Previous C/S  cerclage    Surgeon: GIORGI Rosas  DO  Surgical Procedure: repeat  Section - cerclage removal   ICD-9 Coded: Z98.891  O09.893  Date of Surgery:   Consent signed? N/A      Hospital: [facility]  Inpatient- Length of stay:  3 days.    Requestor:  Domitila Everett     Location:    Surgery Scheduled    Date of Surgery  Time of Surgery 7:30 am   Type of Anesthesia Anticipated: Spinal  Pre-Op: On 21 with Mog at   Post-Op:  6 weeks   Pre-certification routed to Financial Counselors:  Yes    Surgery packet mailed to patient's home address: Yes  Patient instructed NPO 12 hours prior to surgery, arrive 1.5 hour(s) prior to surgery, must have a .  Patient understood and agrees to the plan.      COVID testing:  Emailed all of the [facility] clinic information

## 2021-04-16 NOTE — TELEPHONE ENCOUNTER
Patient has not presented insurance at the most recent clinic visit, roted inquiry to cost of care team to email financial counselors quote for patient's surgery

## 2021-04-16 NOTE — TELEPHONE ENCOUNTER
Nikole routed messages for cpt codes and to the patient for insurnaceDomitila. Did this patient give insurance for her visit and for her surgery?

## 2021-04-19 NOTE — TELEPHONE ENCOUNTER
Patient confirmed she does not have insurance. Routed another message to cost of care team to obtain quote.

## 2021-04-19 NOTE — TELEPHONE ENCOUNTER
Nilam Rosas.    Is this procedure urgent? Our leadership has directed us that the patient needs to have active MA coverage in order to have this surgery. Patient states she cannot pay at this time as she is not working.

## 2021-04-19 NOTE — TELEPHONE ENCOUNTER
Patient has no insurance and is working on applying for MA. She states she cannot pay for the procedure. Emailed leadership on how to proceed with this patient;s payment collection.

## 2021-04-21 ENCOUNTER — PRENATAL OFFICE VISIT (OUTPATIENT)
Dept: OBGYN | Facility: CLINIC | Age: 26
End: 2021-04-21

## 2021-04-21 VITALS
OXYGEN SATURATION: 98 % | SYSTOLIC BLOOD PRESSURE: 123 MMHG | WEIGHT: 198.4 LBS | BODY MASS INDEX: 36.29 KG/M2 | HEART RATE: 91 BPM | DIASTOLIC BLOOD PRESSURE: 75 MMHG

## 2021-04-21 DIAGNOSIS — Z01.818 PREOP GENERAL PHYSICAL EXAM: Primary | ICD-10-CM

## 2021-04-21 DIAGNOSIS — O09.93 SUPERVISION OF HIGH RISK PREGNANCY IN THIRD TRIMESTER: ICD-10-CM

## 2021-04-21 PROCEDURE — 99207 PR NO BILLABLE SERVICE THIS VISIT: CPT | Performed by: OBSTETRICS & GYNECOLOGY

## 2021-04-21 ASSESSMENT — PAIN SCALES - GENERAL: PAINLEVEL: NO PAIN (0)

## 2021-04-21 NOTE — PATIENT INSTRUCTIONS

## 2021-04-21 NOTE — PROGRESS NOTES
[facility] [address] [address]  Phone: [phone]  Primary Provider: Es Chong  Surgeon:  Ghada Rosas  :[id number]}  PREOPERATIVE EVALUATION:  Today's date: 2021    Cassie Duarte is a 25 year old female, , who presents for a preoperative evaluation.    Surgical Information:  Surgery/Procedure: Ortiz cerclage removal followed by a repeat  section at 36 4/7 weeks gestation due to her history of  delivery with a classical  section  Surgery Location: [facility]  Surgeon: Dr. Rosas  Surgery Date: 2021  Time of Surgery: 7:30AM  Where patient plans to recover: At home with family  Fax number for surgical facility: Note does not need to be faxed, will be available electronically in Epic.    Type of Anesthesia Anticipated: Spinal    Subjective     HPI related to upcoming procedure:  This 26 y/o female, , EDC 2021, has been co-managed with [facility] due to her high-risk pregnancy.  She has a hx of a primary classical C/S on 2020 for  labor at 27 3/7 weeks with a double footling breech presentation.  This current pregnancy was complicated by  cervical change which required placement of a Ortiz cerclage on 2021.  [facility] has advised surgical delivery between 36-37 weeks gestation so this was scheduled.  The patient declined sterilization since she is planning to have a third child in 3-4 years.      1. No - Have you ever had a heart attack or stroke?  2. No - Have you ever had surgery on your heart or blood vessels, such as a stent, coronary (heart) bypass, or surgery on an artery in the head, neck, heart, or legs?  3. No - Do you have chest pain when you are physically active?  4. No - Do you have a history of heart failure?  5. No - Do you currently have a cold, bronchitis, or symptoms of other respiratory (head and chest) infections?  6. No - Do you have a cough, shortness of  breath, or wheezing?  7. No - Do you or anyone in your family have a history of blood clots?  8. No - Do you or anyone in your family have a serious bleeding problem, such as long-lasting bleeding after surgeries or cuts?  9. Yes - Have you ever had anemia or been told to take iron pills?  10. No - Have you had any abnormal blood loss such as black, tarry or bloody stools, or abnormal vaginal bleeding?  11. No - Have you ever had a blood transfusion?  12. Yes - Are you willing to have a blood transfusion if it is medically needed before, during, or after your surgery?  13. No - Have you or anyone in your family ever had problems with anesthesia (sedation for surgery)?  14. No - Do you have sleep apnea, excessive snoring, or daytime drowsiness?   15. No - Do you have any artifical heart valves or other implanted medical devices, such as a pacemaker, defibrillator, or continuous glucose monitor?  16. No - Do you have any artifical joints?  17. No - Are you allergic to latex?  18. Yes - Is there any chance that you may be pregnant? 36w3d  Health Care Directive:  Patient does not have a Health Care Directive or Living Will: Not available    Review of Systems  Past OB hx:  Significant for  labor and cervical change.  She required placement of a Ortiz cerclage during this current pregnancy which will be removed tomorrow in the OR prior to the C/S.      Patient Active Problem List    Diagnosis Date Noted     Supervision of other high risk pregnancy, antepartum 2021     Priority: Medium     Boy.         Cervical insufficiency during pregnancy in first trimester, antepartum 2021     Priority: Medium     History of classical  section 2020     Priority: Medium     This patient is NOT a  candidate.  She declines sterilization since she wants a third pregnancy in the future.       High risk pregnancy due to history of previous obstetrical problem, second trimester 2020     Priority:  Medium     I have made the following recommendations given her history of  delivery without obvious evidence of  labor:  1) Cervical length assessments every 2 weeks beginning at 16 weeks and continuing through 23-24 weeks gestation. If there is cervical shortening to <3 cm, but >2.5 cm the frequency of surveillance would be recommended to increase to weekly. If there is cervical shortening to <2.5 cm prior to 23-24 weeks gestation then consideration should be given for placement of cervical cerclage.  2) The benefit of 17-OH progesterone is unclear given recent PROLONG trial results, as well as her  delivery was more suggestive of cervical insufficiency rather than iatrogenic spontaneous  birth. Given this I would not recommended 17-OH progesterone at this time.  3) Delivery is recommended by repeat  section at 36-37 weeks gestation given prior Classical  section.  4) Comprehensive ultrasound is recommended at 18-19 weeks gestation and has been scheduled in our office in conjunction with serial cervical length assessments.    Gudelia Gudino MD  Specialist in Maternal-Fetal Medicine          Past Medical History:   Diagnosis Date     Known health problems: none      Past Surgical History:   Procedure Laterality Date     CERCLAGE CERVICAL N/A 2021    Procedure: CERCLAGE, CERVIX, VAGINAL APPROACH;  Surgeon: Al Madrigal MD;  Location: [facility]      SECTION      classical     Current Outpatient Medications   Medication Sig Dispense Refill     ferrous sulfate (FEROSUL) 325 (65 Fe) MG tablet Take 1 tablet (325 mg) by mouth 2 times daily (Patient not taking: Reported on 2021) 60 tablet 3     Prenatal Vit-Fe Fumarate-FA (PRENATAL MULTIVITAMIN W/IRON) 27-0.8 MG tablet Take 1 tablet by mouth daily         Allergies   Allergen Reactions     Cat Hair Extract      Cats      Long haired female        Social History     Tobacco Use     Smoking status: Never  Smoker     Smokeless tobacco: Never Used   Substance Use Topics     Alcohol use: No     Objective     LMP 2020 (Exact Date)     Physical Exam  Hrt - RRR without murmur  Lungs - CTAB  Fundal height - 36 cm  FHTs per doppler today - 133 bpm    Recent Labs   Lab Test 21  1845 10/15/20  1329   HGB 9.9* 11.3*    234      Diagnostics:  Check labs preoperatively on 2021    Revised Cardiac Risk Index (RCRI):  The patient has the following serious cardiovascular risks for perioperative complications:   - No serious cardiac risks = 0 points   RCRI Interpretation: 1 point: Class II (low risk - 0.9% complication rate)    Informed consent has been reviewed and obtained for removal of her Ortiz cerclage followed by a repeat  section (low segment transverse uterine incision) on 2021.  She also consents to a blood transfusion if emergently necessary.  She does NOT want a PPTL since she plans to have a future 3rd pregnancy.    30 minutes were spent today in chart review, the patient visit, review of tests, and documentation in regards to the issues noted above.    Signed Electronically by: Ghada Rosas DO FACOG, FACS  Copy of this evaluation report is provided to requesting physician.

## 2021-06-03 ENCOUNTER — PRENATAL OFFICE VISIT (OUTPATIENT)
Dept: OBGYN | Facility: CLINIC | Age: 26
End: 2021-06-03
Payer: MEDICAID

## 2021-06-03 VITALS
OXYGEN SATURATION: 100 % | BODY MASS INDEX: 31.53 KG/M2 | HEART RATE: 88 BPM | WEIGHT: 172.4 LBS | DIASTOLIC BLOOD PRESSURE: 74 MMHG | SYSTOLIC BLOOD PRESSURE: 113 MMHG

## 2021-06-03 DIAGNOSIS — N76.0 BV (BACTERIAL VAGINOSIS): Primary | ICD-10-CM

## 2021-06-03 DIAGNOSIS — B96.89 BV (BACTERIAL VAGINOSIS): Primary | ICD-10-CM

## 2021-06-03 DIAGNOSIS — N89.8 VAGINAL DISCHARGE: ICD-10-CM

## 2021-06-03 LAB
SPECIMEN SOURCE: ABNORMAL
WET PREP SPEC: ABNORMAL

## 2021-06-03 PROCEDURE — 87210 SMEAR WET MOUNT SALINE/INK: CPT | Performed by: OBSTETRICS & GYNECOLOGY

## 2021-06-03 PROCEDURE — 99207 PR POST PARTUM EXAM: CPT | Performed by: OBSTETRICS & GYNECOLOGY

## 2021-06-03 RX ORDER — METRONIDAZOLE 500 MG/1
500 TABLET ORAL 2 TIMES DAILY
Qty: 14 TABLET | Refills: 0 | Status: SHIPPED | OUTPATIENT
Start: 2021-06-03 | End: 2021-06-10

## 2021-06-03 ASSESSMENT — PAIN SCALES - GENERAL: PAINLEVEL: NO PAIN (0)

## 2021-06-03 NOTE — PROGRESS NOTES
Cassie is here for a postpartum checkup.  She is s/p repeat C/S followed by removal of cerclage on  and denies any postoperative issues.  However, she feels more depressed after having her second child and would like to be evaluated for this.  She denies any past hx of treatment but has had fleeting thoughts of harming herself but not today and never her children/family.  She is bottle-feeding and would like to discuss her contraceptive options.    She had a   OB History    Para Term  AB Living   2 1 0 1 0 1   SAB TAB Ectopic Multiple Live Births   0 0 0 0 1      # Outcome Date GA Lbr David/2nd Weight Sex Delivery Anes PTL Lv   2             1  20 27w3d  1.077 kg (2 lb 6 oz) F CS-Classical Spinal  RENITA      Complications:  labor, [place] breech presentation      Name: [place]      Since delivery, she has not been breast feeding.  She has had a normal menses with her LMP on 5/15/2021.  She has not had intercourse.  Patient screened for postpartum depression and complaints are positive for thoughts of hurting only herself - never anyone else.  However, she has never formulated a plan and would just like to discuss treatment options for depression.  Screening has also been completed for intimate partner violence. She would like to discuss her contraceptive options.      PE: /74 (BP Location: Right arm, Patient Position: Chair, Cuff Size: Adult Regular)   Pulse 88   Wt 78.2 kg (172 lb 6.4 oz)   LMP 05/15/2021 (Approximate)   SpO2 100%   Breastfeeding No   BMI 31.53 kg/m    Body mass index is 31.53 kg/m .    General Appearance:  healthy, alert, active, no distress  Cardiovascular:  Regular rate and Rhythm without murmur  Lungs:  Clear, without wheeze, rale or rhonchi  Abdomen: Benign, Soft, flat, non-tender, No masses, organomegaly, No inguinal nodes and Bowel sounds normoactive.   Pelvic:       - Ext: Vulva and perineum are normal without lesion, mass or discharge         - Bladder: no tenderness, no masses       - Vagina: Normal mucosa, moderate amount of white vaginal discharge so a wet prep was obtained and submitted       - Cervix: normal and nulliparous       - Uterus:Normal shape, position and consistencyfirm, nontender, nongravid uterus without CMT       - Adnexa: Normal without masses or tenderness       - Rectal: deferred       - Incision has healed well and there is no evidence of infection or complication    A/P  Routine Postpartum Exam, Abnormal Vaginal Discharge, Contraceptive Consult, Depression     - I discussed the new pap recommendations regarding screening.  Explained the rationale for increased intervals between paps.  Questions asked and answered.  She does agree to this regiment.   - Pap was not performed since not due until 12/2022   - Contraception: After discussing all her options and prefers use of condoms until she returns for Nexplanon insertion and a pamphlet on this was provided to the patient.  She declines sterilization since she is considering a third child in the future.   - Refer to FP for depression issues.  She denies any thoughts of harming others and currently does not have any plan to hurt herself but in the past she has had fleeting thoughts of suicide.  We discussed the need to go to the ED/crisis center if she does feel suicidal and she voiced understanding of this plan.  30 minutes were spent today in chart review, the patient visit, review of tests, and documentation in regards to the issues noted above.    Ghada Rosas DO  FACOG, FACS

## 2021-06-03 NOTE — PATIENT INSTRUCTIONS
If you have any questions regarding your visit, Please contact your care team.    [facility]: [phone]      [facility] CLINIC HOURS TELEPHONE NUMBER   Ghada Rosas DO.    Domitila -  Dhara -     ORACIO Jones RN     Monday, Wednesday, [facility]  8:30a.m-5:00 p.m   [facility]  [address] [place] [address]  [phone] ask for [facility]    Imaging [phone]       Urgent Care locations:    [facility] Saturday and Sunday   9 am - 5 pm    Monday-Friday   11 pm - 7 pm  Saturday and Sunday   9 am - 5 pm   [phone]     [facility] Labor [facility] Delivery:  [phone]    If you need a medication refill, please contact your pharmacy. Please allow 3 business days for your refill to be completed.  As always, Thank you for trusting us with your healthcare needs!

## 2021-06-10 ENCOUNTER — PRENATAL OFFICE VISIT (OUTPATIENT)
Dept: OBGYN | Facility: CLINIC | Age: 26
End: 2021-06-10
Payer: MEDICAID

## 2021-06-10 VITALS
SYSTOLIC BLOOD PRESSURE: 114 MMHG | BODY MASS INDEX: 31.64 KG/M2 | HEART RATE: 95 BPM | WEIGHT: 173 LBS | DIASTOLIC BLOOD PRESSURE: 77 MMHG

## 2021-06-10 DIAGNOSIS — Z30.017 NEXPLANON INSERTION: ICD-10-CM

## 2021-06-10 DIAGNOSIS — Z32.00 PREGNANCY EXAMINATION OR TEST, PREGNANCY UNCONFIRMED: Primary | ICD-10-CM

## 2021-06-10 LAB — HCG UR QL: NEGATIVE

## 2021-06-10 PROCEDURE — 81025 URINE PREGNANCY TEST: CPT | Performed by: OBSTETRICS & GYNECOLOGY

## 2021-06-10 PROCEDURE — 11981 INSERTION DRUG DLVR IMPLANT: CPT | Performed by: OBSTETRICS & GYNECOLOGY

## 2021-06-10 RX ORDER — LORATADINE 10 MG/1
10 TABLET ORAL DAILY
COMMUNITY
End: 2022-10-12

## 2021-06-10 NOTE — PATIENT INSTRUCTIONS
If you have any questions regarding your visit, Please contact your care team.  [facility]: [phone]  [facility] CLINIC HOURS TELEPHONE NUMBER   Cephas Agbeh, M.D. Becky-RN Kylie-RN Heidi-RN Deanna-MA Angela-  Dhara-         Monday-Jamie    8:00a.m-4:45 p.m    Tuesday--[place]     8:00a.m-4:45 p.m.    Thursday-Jamie    8:00a.m-4:45 p.m.    Friday-Jamie    8:00a.m-4:45 p.m    [facility]   [address]   [place] [address]   [phone]   Fax [phone]   [phone]     [facility] and Delivery   [address] [place] [address] [place] [address]   [phone]    [facility]  [address] [facility] [address]  [phone]  [phone]   Urgent Care locations:    [facility] Monday-Friday  5 pm - 9 pm  Saturday and Sunday   9 am - 5 pm   Monday-Friday   5 pm - 9 pm  Saturday and Sunday  9 am - 5 pm    [phone]   If you need a medication refill, please contact your pharmacy. Please allow 3 business days for your refill to be completed.  As always, Thank you for trusting us with your healthcare needs!

## 2021-06-10 NOTE — PROGRESS NOTES
Nexplanon Insertion:    Is a pregnancy test required: Yes.  Was it positive or negative?  Negative  Was a consent obtained?  Yes    Subjective: Cassie Duarte is a 25 year old  presents for Nexplanon.    Patient has been given the opportunity to ask questions about all forms of birth control, including all options appropriate for Cassie Duarte. Discussed that no method of birth control, except abstinence is 100% effective against pregnancy or sexually transmitted infection.     Cassie Duarte understands she may have the Nexplanon removed at any time and it should be removed by a health care provider.    The entire insertion procedure was reviewed with the patient, including care after placement.    Patient's last menstrual period was 05/15/2021 (approximate). . No allergy to betadine or shellfish. Patient declines STD screening  HCG Qual Urine   Date Value Ref Range Status   2019 Positive (A) NEG^Negative Final     Comment:     This test is for screening purposes.  Results should be interpreted along with   the clinical picture.  Confirmation testing is available if warranted by   ordering [id number], HCG Quantitative Pregnancy.           LMP 05/15/2021 (Approximate)     PROCEDURE NOTE: -- Nexplanon Insertion    Reason for Insertion: contraception    Patient was placed supine with left arm exposed.  Jun was made 8-10 cm above medial epicondyle and a guiding jun 4 cm above the first.  Arm was prepped with Betadine. Insertion point was anesthetized with 10 mL 1% lidocaine. After stretching the skin with thumb and index finger around the insertion site, skin punctured with the tip of the needle inserted at 30 degrees and then lowered to horizontal position. The needle was then advanced to its full length. Applicator was then stabilized and slider was unlocked. Slider was pulled back until it stopped and then removed.    Correct placement of the implant was confirmed by palpation in the patient's arm and  visualizing the purple top of the obturator.   Bandage and pressure dressing applied to insertion site.    Lot # [id number]  Exp: [id number]    EBL: minimal    Complications: none    ASSESSMENT: No diagnosis found.     PLAN:    Given 's handouts, including when to have Nexplanon removed, list of danger s/sx, side effects and follow up recommended. Encouraged condom use for prevention of STD. Back up contraception advised for 7 days. Advised to call for any fever, for prolonged or severe pain or bleeding, abnormal vaginal dischage. She was advised to use pain medications (ibuprofen) as needed for mild to moderate pain.     Cephas Mawuena Agbeh, MD

## 2021-09-26 ENCOUNTER — HEALTH MAINTENANCE LETTER (OUTPATIENT)
Age: 26
End: 2021-09-26

## 2022-07-03 ENCOUNTER — HEALTH MAINTENANCE LETTER (OUTPATIENT)
Age: 27
End: 2022-07-03

## 2022-08-28 ENCOUNTER — OFFICE VISIT (OUTPATIENT)
Dept: URGENT CARE | Facility: URGENT CARE | Age: 27
End: 2022-08-28
Payer: COMMERCIAL

## 2022-08-28 VITALS
SYSTOLIC BLOOD PRESSURE: 126 MMHG | DIASTOLIC BLOOD PRESSURE: 82 MMHG | TEMPERATURE: 98.3 F | WEIGHT: 148.5 LBS | HEART RATE: 83 BPM | OXYGEN SATURATION: 100 % | BODY MASS INDEX: 27.16 KG/M2 | RESPIRATION RATE: 20 BRPM

## 2022-08-28 DIAGNOSIS — K13.79 INFECTION OF BUCCAL SPACE: Primary | ICD-10-CM

## 2022-08-28 PROCEDURE — 99203 OFFICE O/P NEW LOW 30 MIN: CPT | Performed by: STUDENT IN AN ORGANIZED HEALTH CARE EDUCATION/TRAINING PROGRAM

## 2022-08-28 NOTE — PROGRESS NOTES
Assessment & Plan     Infection of buccal space  Patient presents with tenderness and swelling of the right cheek.  She does not have signs of infection of any of her teeth on exam.  She has a tender lump in the right anterior portion of her cheek and I am concerned about infection so I am going to start her on Augmentin twice daily for 10 days.  She is going to call her dentist tomorrow to see if he is able to see her to evaluate her teeth more thoroughly.  - amoxicillin-clavulanate (AUGMENTIN) 875-125 MG tablet  Dispense: 20 tablet; Refill: 0         No follow-ups on file.    Randa Small, AMINA [facility] SARASTEF Matthews is a 27 year old female who presents to clinic today for the following health issues:  Chief Complaint   Patient presents with     Urgent Care     Urgent care visit for a swollen cheek.     Facial Swelling     Swelling on the Rt cheek. She had a dental issue on that side that got resolved in July. She had all of her wisdom teeth pulled and this seemed to fix the issue. Since this past Friday she noticed right cheek swelling that got worse this past Saturday. [place] like a [place]ais[place] horse in her face and gave her a headache with a 9/10 pain rating. Today it is better but is still swollen and her face feels stiff. She does not have any appointments yet with the dentists since they are closed on the weekends.     HPI          Review of Systems  Constitutional, HEENT, cardiovascular, pulmonary, gi and gu systems are negative, except as otherwise noted.      Objective    /82 (BP Location: Left arm, Patient Position: Sitting, Cuff Size: Adult Regular)   Pulse 83   Temp 98.3  F (36.8  C) (Tympanic)   Resp 20   Wt 67.4 kg (148 lb 8 oz)   SpO2 100%   Breastfeeding No   BMI 27.16 kg/m    Physical Exam   GENERAL APPEARANCE: alert and no distress  EYES: Eyes grossly normal to inspection and conjunctivae and sclerae normal  HENT: tenderness and  swelling of the right cheek with tender lump in the anterior portion of her right cheek, no gingival erythema  NECK: no adenopathy and no asymmetry, masses, or scars

## 2022-10-12 ENCOUNTER — OFFICE VISIT (OUTPATIENT)
Dept: FAMILY MEDICINE | Facility: CLINIC | Age: 27
End: 2022-10-12
Payer: COMMERCIAL

## 2022-10-12 VITALS
BODY MASS INDEX: 26.38 KG/M2 | TEMPERATURE: 97.9 F | SYSTOLIC BLOOD PRESSURE: 116 MMHG | RESPIRATION RATE: 18 BRPM | DIASTOLIC BLOOD PRESSURE: 76 MMHG | OXYGEN SATURATION: 99 % | WEIGHT: 148.9 LBS | HEART RATE: 85 BPM | HEIGHT: 63 IN

## 2022-10-12 DIAGNOSIS — F41.9 ANXIETY: ICD-10-CM

## 2022-10-12 DIAGNOSIS — F33.1 MODERATE EPISODE OF RECURRENT MAJOR DEPRESSIVE DISORDER (H): Primary | ICD-10-CM

## 2022-10-12 PROCEDURE — 99214 OFFICE O/P EST MOD 30 MIN: CPT | Performed by: FAMILY MEDICINE

## 2022-10-12 RX ORDER — BUPROPION HYDROCHLORIDE 150 MG/1
150 TABLET ORAL EVERY MORNING
Qty: 30 TABLET | Refills: 0 | Status: SHIPPED | OUTPATIENT
Start: 2022-10-12 | End: 2022-11-15

## 2022-10-12 RX ORDER — HYDROXYZINE HYDROCHLORIDE 25 MG/1
25 TABLET, FILM COATED ORAL 3 TIMES DAILY PRN
Qty: 20 TABLET | Refills: 0 | Status: SHIPPED | OUTPATIENT
Start: 2022-10-12 | End: 2022-10-26

## 2022-10-12 ASSESSMENT — ANXIETY QUESTIONNAIRES
GAD7 TOTAL SCORE: 11
GAD7 TOTAL SCORE: 11
7. FEELING AFRAID AS IF SOMETHING AWFUL MIGHT HAPPEN: NOT AT ALL
2. NOT BEING ABLE TO STOP OR CONTROL WORRYING: MORE THAN HALF THE DAYS
IF YOU CHECKED OFF ANY PROBLEMS ON THIS QUESTIONNAIRE, HOW DIFFICULT HAVE THESE PROBLEMS MADE IT FOR YOU TO DO YOUR WORK, TAKE CARE OF THINGS AT HOME, OR GET ALONG WITH OTHER PEOPLE: VERY DIFFICULT
4. TROUBLE RELAXING: SEVERAL DAYS
5. BEING SO RESTLESS THAT IT IS HARD TO SIT STILL: SEVERAL DAYS
1. FEELING NERVOUS, ANXIOUS, OR ON EDGE: MORE THAN HALF THE DAYS
3. WORRYING TOO MUCH ABOUT DIFFERENT THINGS: NEARLY EVERY DAY
8. IF YOU CHECKED OFF ANY PROBLEMS, HOW DIFFICULT HAVE THESE MADE IT FOR YOU TO DO YOUR WORK, TAKE CARE OF THINGS AT HOME, OR GET ALONG WITH OTHER PEOPLE?: VERY DIFFICULT
7. FEELING AFRAID AS IF SOMETHING AWFUL MIGHT HAPPEN: NOT AT ALL
GAD7 TOTAL SCORE: 11
6. BECOMING EASILY ANNOYED OR IRRITABLE: MORE THAN HALF THE DAYS

## 2022-10-12 ASSESSMENT — PAIN SCALES - GENERAL: PAINLEVEL: NO PAIN (0)

## 2022-10-12 ASSESSMENT — PATIENT HEALTH QUESTIONNAIRE - PHQ9
SUM OF ALL RESPONSES TO PHQ QUESTIONS 1-9: 20
10. IF YOU CHECKED OFF ANY PROBLEMS, HOW DIFFICULT HAVE THESE PROBLEMS MADE IT FOR YOU TO DO YOUR WORK, TAKE CARE OF THINGS AT HOME, OR GET ALONG WITH OTHER PEOPLE: SOMEWHAT DIFFICULT
SUM OF ALL RESPONSES TO PHQ QUESTIONS 1-9: 20

## 2022-10-12 NOTE — PATIENT INSTRUCTIONS
Plan:    1) start bupropion once daily   - targets depression and anxiety   - can help with focus   - Very low risk of side effects    2) hydroxyzine as needed for anxiety or sleep    - can make drowsy, but not so drowsy you will not hear kids    3) send me a [email or web address] message or give me a call in a week or 2 with progress and we can decide to continue the same or make changes or perhaps increase dosage.  Might take a few weeks to hit maximum effects, but we should start seeing some good effects sooner than that.

## 2022-10-12 NOTE — PROGRESS NOTES
"  Assessment & Plan     Moderate episode of recurrent major depressive disorder (H)  My first visit with patient and previous history reviewed with her and in her chart.  Tells me she has a longstanding history of depression anxiety and possibly some ADHD that goes back to childhood.  For many years she just thought that what she felt was normal but now she is realizing it is not.  She has been seeing a counselor for some time and it is helpful but still feels down.  Admits to some feelings of lack of self-worth but not really an active suicidal mentality.  More just wondering if things are ever going to get better.  Has associated anxiety and what sounds like some anxiety attacks periodically.  Does not think she has been on antidepressant therapy in the past.  Think she might of been on a stimulant at 1 point.  But she is interested in medication.  I would like to start with Wellbutrin once daily.  Discussed risks and benefits.  Contact me in a couple of weeks for continuance or adjustment.  Continue counseling.  - buPROPion (WELLBUTRIN XL) 150 MG 24 hr tablet; Take 1 tablet (150 mg) by mouth every morning    Anxiety  As needed usage for anxiety and/or sleep.  Discussed mechanism of action of the proposed medication, as well as potential effects, both good and bad.  Patient expressed understanding and agreed with treatment.   - hydrOXYzine (ATARAX) 25 MG tablet; Take 1 tablet (25 mg) by mouth 3 times daily as needed for anxiety         BMI:   Estimated body mass index is 26.06 kg/m  as calculated from the following:    Height as of this encounter: 1.61 m (5' 3.39\").    Weight as of this encounter: 67.5 kg (148 lb 14.4 oz).       Depression Screening Follow Up    PHQ 10/12/2022   PHQ-9 Total Score 20   Q9: Thoughts of better off dead/self-harm past 2 weeks More than half the days   F/U: Thoughts of suicide or self-harm Yes   F/U: Self harm-plan No   F/U: Self-harm action No   F/U: Safety concerns No     Last PHQ-9 " 10/12/2022   1.  Little interest or pleasure in doing things 2   2.  Feeling down, depressed, or hopeless 2   3.  Trouble falling or staying asleep, or sleeping too much 2   4.  Feeling tired or having little energy 2   5.  Poor appetite or overeating 3   6.  Feeling bad about yourself 3   7.  Trouble concentrating 2   8.  Moving slowly or restless 2   Q9: Thoughts of better off dead/self-harm past 2 weeks 2   PHQ-9 Total Score 20   Difficulty at work, home, or with people -   In the past two weeks have you had thoughts of suicide or self harm? Yes   Do you have concerns about your personal safety or the safety of others? No   In the past 2 weeks have you thought about a plan or had intention to harm yourself? No   In the past 2 weeks have you acted on these thoughts in any way? No         See Patient Instructions    Return in about 2 weeks (around 10/26/2022) for Contact me with [employer] message.    Flor Olsen MD  [facility] NIKITA Matthews is a 27 year old, presenting for the following health issues:  Depression, Anxiety, and Establish Care      History of Present Illness       Reason for visit:  Medication    She eats 0-1 servings of fruits and vegetables daily.She consumes 4 sweetened beverage(s) daily.She exercises with enough effort to increase her heart rate 10 to 19 minutes per day.  She exercises with enough effort to increase her heart rate 3 or less days per week.   She is taking medications regularly.    Today's PHQ-9         PHQ-9 Total Score: 20    PHQ-9 Q9 Thoughts of better off dead/self-harm past 2 weeks :   More than half the days  Thoughts of suicide or self harm: (P) Yes  Self-harm Plan:   (P) No  Self-harm Action:     (P) No  Safety concerns for self or others: (P) No    How difficult have these problems made it for you to do your work, take care of things at home, or get along with other people: Somewhat difficult  Today's CARLOS-7 Score: 11    "    Here today for depression and anxiety as above.    Review of Systems   Constitutional, HEENT, cardiovascular, pulmonary, gi and gu systems are negative, except as otherwise noted.      Objective    /76   Pulse 85   Temp 97.9  F (36.6  C) (Tympanic)   Resp 18   Ht 1.61 m (5' 3.39\")   Wt 67.5 kg (148 lb 14.4 oz)   LMP 09/25/2022 (Exact Date)   SpO2 99%   BMI 26.06 kg/m    Body mass index is 26.06 kg/m .  Physical Exam   Psych: Alert and oriented times 3; coherent speech, normal   rate and volume, able to articulate logical thoughts, able   to abstract reason, no tangential thoughts, no hallucinations   or delusions  Her affect is is definitely muted and a bit sad.  But appropriate.  S1 and S2 normal, no murmurs, clicks, gallops or rubs. Regular rate and rhythm. Chest is clear; no wheezes or rales. No edema or JVD.  Past labs reviewed with the patient.                     "

## 2022-11-14 DIAGNOSIS — F41.9 ANXIETY: ICD-10-CM

## 2022-11-14 DIAGNOSIS — F33.1 MODERATE EPISODE OF RECURRENT MAJOR DEPRESSIVE DISORDER (H): ICD-10-CM

## 2022-11-14 NOTE — TELEPHONE ENCOUNTER
Reason for Call:  Medication or medication refill:    Do you use a [facility]?  Name of the pharmacy and phone number for the current request:  [facility] [phone]    Name of the medication requested: Bupropion and Hydroxyzine    Other request:none    Can we leave a detailed message on this number? YES    Phone number patient can be reached at: Cell number on file:    Telephone Information:   Mobile [phone]       Best Time: anytime and can leave message    Call taken on 11/14/2022 at 11:04 AM by Edilia Garcia

## 2022-11-15 RX ORDER — BUPROPION HYDROCHLORIDE 150 MG/1
150 TABLET ORAL EVERY MORNING
Qty: 30 TABLET | Refills: 0 | Status: SHIPPED | OUTPATIENT
Start: 2022-11-15 | End: 2022-12-16

## 2022-11-15 RX ORDER — HYDROXYZINE HYDROCHLORIDE 25 MG/1
25 TABLET, FILM COATED ORAL 3 TIMES DAILY PRN
Qty: 20 TABLET | Refills: 1 | Status: SHIPPED | OUTPATIENT
Start: 2022-11-15 | End: 2022-12-16

## 2022-12-06 ENCOUNTER — MYC REFILL (OUTPATIENT)
Dept: FAMILY MEDICINE | Facility: CLINIC | Age: 27
End: 2022-12-06

## 2022-12-06 DIAGNOSIS — F33.1 MODERATE EPISODE OF RECURRENT MAJOR DEPRESSIVE DISORDER (H): ICD-10-CM

## 2022-12-06 RX ORDER — BUPROPION HYDROCHLORIDE 150 MG/1
150 TABLET ORAL EVERY MORNING
Qty: 30 TABLET | Refills: 0 | OUTPATIENT
Start: 2022-12-06

## 2022-12-16 DIAGNOSIS — F41.9 ANXIETY: ICD-10-CM

## 2022-12-16 DIAGNOSIS — F33.1 MODERATE EPISODE OF RECURRENT MAJOR DEPRESSIVE DISORDER (H): ICD-10-CM

## 2022-12-16 RX ORDER — BUPROPION HYDROCHLORIDE 150 MG/1
150 TABLET ORAL EVERY MORNING
Qty: 30 TABLET | Refills: 0 | Status: SHIPPED | OUTPATIENT
Start: 2022-12-16 | End: 2023-01-13

## 2022-12-16 RX ORDER — HYDROXYZINE HYDROCHLORIDE 25 MG/1
25 TABLET, FILM COATED ORAL 3 TIMES DAILY PRN
Qty: 20 TABLET | Refills: 1 | Status: SHIPPED | OUTPATIENT
Start: 2022-12-16 | End: 2023-01-13

## 2022-12-16 NOTE — TELEPHONE ENCOUNTER
Refills.  Still awaiting an update from the patient on how this is working.  She can send a [employer] message.   Aylin

## 2022-12-16 NOTE — TELEPHONE ENCOUNTER
Reason for Call:  Medication or medication refill:    Do you use a [facility]?  Name of the pharmacy and phone number for the current request:  [id number] - [facility], [place] - [address]    Name of the medication requested: hydrOXYzine (ATARAX) 25 MG tablet, buPROPion (WELLBUTRIN XL) 150 MG 24 hr tablet    Other request: NA    Can we leave a detailed message on this number? YES    Phone number patient can be reached at: Cell number on file:    Telephone Information:   Mobile [phone]       Best Time: ANY    Call taken on 12/16/2022 at 1:49 PM by Kathy Teran

## 2022-12-27 NOTE — TELEPHONE ENCOUNTER
Called Pt left Vm relaying message. Nothing further is needed stated that if pt had any questions or concerns to call the clinic.       Stephani XIONG Visit Facilitator

## 2023-01-08 ENCOUNTER — HEALTH MAINTENANCE LETTER (OUTPATIENT)
Age: 28
End: 2023-01-08

## 2023-01-10 ENCOUNTER — TELEPHONE (OUTPATIENT)
Dept: FAMILY MEDICINE | Facility: CLINIC | Age: 28
End: 2023-01-10

## 2023-01-10 ENCOUNTER — MYC MEDICAL ADVICE (OUTPATIENT)
Dept: FAMILY MEDICINE | Facility: CLINIC | Age: 28
End: 2023-01-10
Payer: COMMERCIAL

## 2023-01-10 ASSESSMENT — ANXIETY QUESTIONNAIRES
2. NOT BEING ABLE TO STOP OR CONTROL WORRYING: SEVERAL DAYS
5. BEING SO RESTLESS THAT IT IS HARD TO SIT STILL: MORE THAN HALF THE DAYS
4. TROUBLE RELAXING: SEVERAL DAYS
7. FEELING AFRAID AS IF SOMETHING AWFUL MIGHT HAPPEN: SEVERAL DAYS
3. WORRYING TOO MUCH ABOUT DIFFERENT THINGS: MORE THAN HALF THE DAYS
6. BECOMING EASILY ANNOYED OR IRRITABLE: MORE THAN HALF THE DAYS
GAD7 TOTAL SCORE: 10
7. FEELING AFRAID AS IF SOMETHING AWFUL MIGHT HAPPEN: SEVERAL DAYS
1. FEELING NERVOUS, ANXIOUS, OR ON EDGE: SEVERAL DAYS

## 2023-01-10 ASSESSMENT — PATIENT HEALTH QUESTIONNAIRE - PHQ9
SUM OF ALL RESPONSES TO PHQ QUESTIONS 1-9: 12
SUM OF ALL RESPONSES TO PHQ QUESTIONS 1-9: 12

## 2023-01-13 ENCOUNTER — VIRTUAL VISIT (OUTPATIENT)
Dept: FAMILY MEDICINE | Facility: CLINIC | Age: 28
End: 2023-01-13
Payer: COMMERCIAL

## 2023-01-13 DIAGNOSIS — F41.9 ANXIETY: ICD-10-CM

## 2023-01-13 DIAGNOSIS — R41.840 POOR CONCENTRATION: ICD-10-CM

## 2023-01-13 DIAGNOSIS — F33.1 MODERATE EPISODE OF RECURRENT MAJOR DEPRESSIVE DISORDER (H): Primary | ICD-10-CM

## 2023-01-13 PROCEDURE — 96127 BRIEF EMOTIONAL/BEHAV ASSMT: CPT | Mod: 93 | Performed by: FAMILY MEDICINE

## 2023-01-13 PROCEDURE — 99214 OFFICE O/P EST MOD 30 MIN: CPT | Mod: 93 | Performed by: FAMILY MEDICINE

## 2023-01-13 RX ORDER — BUPROPION HYDROCHLORIDE 300 MG/1
300 TABLET ORAL EVERY MORNING
Qty: 90 TABLET | Refills: 1 | Status: SHIPPED | OUTPATIENT
Start: 2023-01-13 | End: 2023-08-04

## 2023-01-13 RX ORDER — HYDROXYZINE HYDROCHLORIDE 25 MG/1
25 TABLET, FILM COATED ORAL 3 TIMES DAILY PRN
Qty: 30 TABLET | Refills: 2 | Status: SHIPPED | OUTPATIENT
Start: 2023-01-13 | End: 2023-07-03

## 2023-01-13 ASSESSMENT — PATIENT HEALTH QUESTIONNAIRE - PHQ9
SUM OF ALL RESPONSES TO PHQ QUESTIONS 1-9: 14
SUM OF ALL RESPONSES TO PHQ QUESTIONS 1-9: 14
10. IF YOU CHECKED OFF ANY PROBLEMS, HOW DIFFICULT HAVE THESE PROBLEMS MADE IT FOR YOU TO DO YOUR WORK, TAKE CARE OF THINGS AT HOME, OR GET ALONG WITH OTHER PEOPLE: SOMEWHAT DIFFICULT

## 2023-01-13 NOTE — PROGRESS NOTES
Cassie is a 27 year old who is being evaluated via a billable telephone visit.      What phone number would you like to be contacted at? [phone]   How would you like to obtain your AVS? [employer]    Distant Location (provider location):  Off-site    Assessment & Plan     Moderate episode of recurrent major depressive disorder (H)  Mood has definitely improved on Wellbutrin but still having some concentration issues.  We will increase the Wellbutrin to 300 mg daily and plan follow-up in 2 with a weeks.  Can contact me with a [employer] message.  Could consider stimulant therapy if not improving enough.  - buPROPion (WELLBUTRIN XL) 300 MG 24 hr tablet; Take 1 tablet (300 mg) by mouth every morning    Anxiety  Happy with using this intermittently for anxiety and sleep.  We will continue same  - hydrOXYzine (ATARAX) 25 MG tablet; Take 1 tablet (25 mg) by mouth 3 times daily as needed for anxiety    Poor concentration  As above  - buPROPion (WELLBUTRIN XL) 300 MG 24 hr tablet; Take 1 tablet (300 mg) by mouth every morning       Depression Screening Follow Up    PHQ 1/13/2023   PHQ-9 Total Score 14   Q9: Thoughts of better off dead/self-harm past 2 weeks Several days   F/U: Thoughts of suicide or self-harm No   F/U: Self harm-plan -   F/U: Self-harm action -   F/U: Safety concerns No     Last PHQ-9 1/13/2023   1.  Little interest or pleasure in doing things 2   2.  Feeling down, depressed, or hopeless 1   3.  Trouble falling or staying asleep, or sleeping too much 2   4.  Feeling tired or having little energy 2   5.  Poor appetite or overeating 1   6.  Feeling bad about yourself 2   7.  Trouble concentrating 1   8.  Moving slowly or restless 2   Q9: Thoughts of better off dead/self-harm past 2 weeks 1   PHQ-9 Total Score 14   Difficulty at work, home, or with people -   In the past two weeks have you had thoughts of suicide or self harm? No   Do you have concerns about your personal safety or the safety of others? No   In  the past 2 weeks have you thought about a plan or had intention to harm yourself? -   In the past 2 weeks have you acted on these thoughts in any way? -         See Patient Instructions    Return in about 3 weeks (around 2/3/2023) for Contact me with Keyanna morrell.    Flor Olsen MD  [facility] NIKITA Matthews is a 27 year old, presenting for the following health issues:  Depression and Anxiety      History of Present Illness       Mental Health Follow-up:  Patient presents to follow-up on Depression & Anxiety.Patient's depression since last visit has been:  Medium  The patient is not having other symptoms associated with depression.  Patient's anxiety since last visit has been:  Bad  The patient is not having other symptoms associated with anxiety.  Any significant life events: No  Patient is not feeling anxious or having panic attacks.  Patient has no concerns about alcohol or drug use.    She eats 0-1 servings of fruits and vegetables daily.She consumes 0 sweetened beverage(s) daily.She exercises with enough effort to increase her heart rate 30 to 60 minutes per day.  She exercises with enough effort to increase her heart rate 7 days per week.   She is taking medications regularly.    Today's PHQ-9         PHQ-9 Total Score: 14    PHQ-9 Q9 Thoughts of better off dead/self-harm past 2 weeks :   Several days  Thoughts of suicide or self harm: (P) No  Self-harm Plan:     Self-harm Action:       Safety concerns for self or others: (P) No    How difficult have these problems made it for you to do your work, take care of things at home, or get along with other people: Somewhat difficult       Visit today in follow-up of depression, anxiety, concentration.  Most concerned about concentration issue is likely been more busy lately    Review of Systems   Constitutional, HEENT, cardiovascular, pulmonary, gi and gu systems are negative, except as otherwise noted.       Objective    Vitals - Patient Reported  Pain Score: No Pain (0)      Vitals:  No vitals were obtained today due to virtual visit.    Physical Exam   healthy, alert and no distress  PSYCH: Alert and oriented times 3; coherent speech, normal   rate and volume, able to articulate logical thoughts, able   to abstract reason, no tangential thoughts, no hallucinations   or delusions  Her affect is normal  RESP: No cough, no audible wheezing, able to talk in full sentences  Remainder of exam unable to be completed due to telephone visits    Past labs reviewed with the patient.             Phone call duration: 12 minutes

## 2023-04-28 ENCOUNTER — TELEPHONE (OUTPATIENT)
Dept: FAMILY MEDICINE | Facility: CLINIC | Age: 28
End: 2023-04-28
Payer: COMMERCIAL

## 2023-04-28 NOTE — TELEPHONE ENCOUNTER
Patient called to refill medications  buPROPion (WELLBUTRIN XL) 300 MG 24 hr tablet    hydrOXYzine (ATARAX) 25 MG tablet    But was unable to reach [facility]. I called pharmacy and she does have have refills available and they will fill.

## 2023-06-29 DIAGNOSIS — F41.9 ANXIETY: ICD-10-CM

## 2023-07-03 RX ORDER — HYDROXYZINE HYDROCHLORIDE 25 MG/1
25 TABLET, FILM COATED ORAL 3 TIMES DAILY PRN
Qty: 30 TABLET | Refills: 0 | Status: SHIPPED | OUTPATIENT
Start: 2023-07-03 | End: 2023-07-14

## 2023-07-14 ENCOUNTER — TELEPHONE (OUTPATIENT)
Dept: FAMILY MEDICINE | Facility: CLINIC | Age: 28
End: 2023-07-14

## 2023-07-14 DIAGNOSIS — F41.9 ANXIETY: ICD-10-CM

## 2023-07-14 RX ORDER — HYDROXYZINE HYDROCHLORIDE 25 MG/1
25 TABLET, FILM COATED ORAL 3 TIMES DAILY PRN
Qty: 30 TABLET | Refills: 0 | Status: SHIPPED | OUTPATIENT
Start: 2023-07-14 | End: 2023-08-04

## 2023-07-14 NOTE — TELEPHONE ENCOUNTER
Reason for Call:  Medication or medication refill:    Do you use a [facility]?  Name of the pharmacy and phone number for the current request:  [facility]     Name of the medication requested: hydrOXYzine (ATARAX) 25 MG tablet    Other request: needs provider to refill no more refills on medication     Can we leave a detailed message on this number? YES    Phone number patient can be reached at: Cell number on file:    Telephone Information:   Mobile [phone]       Call taken on 7/14/2023 at 1:56 PM by Stephani Luna

## 2023-07-14 NOTE — TELEPHONE ENCOUNTER
I will send in a refill of the hydroxyzine.  The patient is overdue for follow-up on the depression.  Virtual visit would be fine.  We certainly need to update the PHQ-9 but looking for follow-up because we change the dosage at her last visit.

## 2023-07-16 ENCOUNTER — HEALTH MAINTENANCE LETTER (OUTPATIENT)
Age: 28
End: 2023-07-16

## 2023-07-17 NOTE — TELEPHONE ENCOUNTER
Called patient and scheduled appointment   No further needs at this time     Rhonda TORRES - Visit facilitator

## 2023-08-04 DIAGNOSIS — F33.1 MODERATE EPISODE OF RECURRENT MAJOR DEPRESSIVE DISORDER (H): ICD-10-CM

## 2023-08-04 DIAGNOSIS — F41.9 ANXIETY: ICD-10-CM

## 2023-08-04 DIAGNOSIS — R41.840 POOR CONCENTRATION: ICD-10-CM

## 2023-08-04 RX ORDER — BUPROPION HYDROCHLORIDE 300 MG/1
300 TABLET ORAL EVERY MORNING
Qty: 30 TABLET | Refills: 0 | Status: SHIPPED | OUTPATIENT
Start: 2023-08-04 | End: 2023-08-18

## 2023-08-04 RX ORDER — HYDROXYZINE HYDROCHLORIDE 25 MG/1
25 TABLET, FILM COATED ORAL 3 TIMES DAILY PRN
Qty: 30 TABLET | Refills: 0 | Status: SHIPPED | OUTPATIENT
Start: 2023-08-04 | End: 2023-08-18

## 2023-08-04 NOTE — TELEPHONE ENCOUNTER
Patient called noting she has only enough meds for the next 3 - 4 days.   hydrOXYzine (ATARAX) 25 MG tablet   buPROPion (WELLBUTRIN XL) 300 MG 24 hr tablet   Please fille as soon as possible.

## 2023-08-18 ENCOUNTER — VIRTUAL VISIT (OUTPATIENT)
Dept: FAMILY MEDICINE | Facility: CLINIC | Age: 28
End: 2023-08-18
Payer: COMMERCIAL

## 2023-08-18 DIAGNOSIS — F33.1 MODERATE EPISODE OF RECURRENT MAJOR DEPRESSIVE DISORDER (H): Primary | ICD-10-CM

## 2023-08-18 DIAGNOSIS — R41.840 POOR CONCENTRATION: ICD-10-CM

## 2023-08-18 DIAGNOSIS — F41.9 ANXIETY: ICD-10-CM

## 2023-08-18 PROCEDURE — 99214 OFFICE O/P EST MOD 30 MIN: CPT | Mod: VID | Performed by: FAMILY MEDICINE

## 2023-08-18 RX ORDER — HYDROXYZINE HYDROCHLORIDE 50 MG/1
50 TABLET, FILM COATED ORAL 3 TIMES DAILY PRN
Qty: 60 TABLET | Refills: 2 | Status: SHIPPED | OUTPATIENT
Start: 2023-08-18 | End: 2024-05-30

## 2023-08-18 RX ORDER — BUPROPION HYDROCHLORIDE 300 MG/1
300 TABLET ORAL EVERY MORNING
Qty: 90 TABLET | Refills: 1 | Status: SHIPPED | OUTPATIENT
Start: 2023-08-18 | End: 2024-06-28

## 2023-08-18 RX ORDER — HYDROXYZINE HYDROCHLORIDE 50 MG/1
25 TABLET, FILM COATED ORAL 3 TIMES DAILY PRN
Qty: 60 TABLET | Refills: 2 | Status: SHIPPED | OUTPATIENT
Start: 2023-08-18 | End: 2023-08-18

## 2023-08-18 ASSESSMENT — ANXIETY QUESTIONNAIRES
6. BECOMING EASILY ANNOYED OR IRRITABLE: SEVERAL DAYS
1. FEELING NERVOUS, ANXIOUS, OR ON EDGE: MORE THAN HALF THE DAYS
5. BEING SO RESTLESS THAT IT IS HARD TO SIT STILL: SEVERAL DAYS
4. TROUBLE RELAXING: SEVERAL DAYS
GAD7 TOTAL SCORE: 11
7. FEELING AFRAID AS IF SOMETHING AWFUL MIGHT HAPPEN: SEVERAL DAYS
3. WORRYING TOO MUCH ABOUT DIFFERENT THINGS: NEARLY EVERY DAY
GAD7 TOTAL SCORE: 11
IF YOU CHECKED OFF ANY PROBLEMS ON THIS QUESTIONNAIRE, HOW DIFFICULT HAVE THESE PROBLEMS MADE IT FOR YOU TO DO YOUR WORK, TAKE CARE OF THINGS AT HOME, OR GET ALONG WITH OTHER PEOPLE: SOMEWHAT DIFFICULT
2. NOT BEING ABLE TO STOP OR CONTROL WORRYING: MORE THAN HALF THE DAYS

## 2023-08-18 ASSESSMENT — PATIENT HEALTH QUESTIONNAIRE - PHQ9
SUM OF ALL RESPONSES TO PHQ QUESTIONS 1-9: 11
SUM OF ALL RESPONSES TO PHQ QUESTIONS 1-9: 11
10. IF YOU CHECKED OFF ANY PROBLEMS, HOW DIFFICULT HAVE THESE PROBLEMS MADE IT FOR YOU TO DO YOUR WORK, TAKE CARE OF THINGS AT HOME, OR GET ALONG WITH OTHER PEOPLE: SOMEWHAT DIFFICULT

## 2023-08-18 NOTE — PROGRESS NOTES
"Cassie is a 28 year old who is being evaluated via a billable video visit.      How would you like to obtain your AVS? MyChart  If the video visit is dropped, the invitation should be resent by: Text to cell phone: [phone]  Will anyone else be joining your video visit? No          Assessment & Plan     Moderate episode of recurrent major depressive disorder (H)  Visit with patient today in follow-up of depression and anxiety.  We reviewed PHQ-9 scores but she actually feels that the Wellbutrin is doing a good job on depression and does not want to make changes.  But still having a lot of anxiety related to social situations.  Hydroxyzine helps but could be stronger so we will bump up the dosage of that a little bit.  - buPROPion (WELLBUTRIN XL) 300 MG 24 hr tablet; Take 1 tablet (300 mg) by mouth every morning    Anxiety  As above  - hydrOXYzine (ATARAX) 50 MG tablet; Take 1 tablets (50 mg) by mouth 3 times daily as needed for anxiety    Poor concentration  As above  - buPROPion (WELLBUTRIN XL) 300 MG 24 hr tablet; Take 1 tablet (300 mg) by mouth every morning             BMI:   Estimated body mass index is 26.06 kg/m  as calculated from the following:    Height as of 10/12/22: 1.61 m (5' 3.39\").    Weight as of 10/12/22: 67.5 kg (148 lb 14.4 oz).       Depression Screening Follow Up        8/18/2023     8:37 AM   PHQ   PHQ-9 Total Score 11   Q9: Thoughts of better off dead/self-harm past 2 weeks Several days   F/U: Thoughts of suicide or self-harm No   F/U: Safety concerns No         8/18/2023     8:37 AM   Last PHQ-9   1.  Little interest or pleasure in doing things 1   2.  Feeling down, depressed, or hopeless 1   3.  Trouble falling or staying asleep, or sleeping too much 1   4.  Feeling tired or having little energy 1   5.  Poor appetite or overeating 2   6.  Feeling bad about yourself 2   7.  Trouble concentrating 1   8.  Moving slowly or restless 1   Q9: Thoughts of better off dead/self-harm past 2 weeks 1 "   PHQ-9 Total Score 11   In the past two weeks have you had thoughts of suicide or self harm? No   Do you have concerns about your personal safety or the safety of others? No       See Patient Instructions    Flor Olsen MD  [facility] NIKITA Matthews is a 28 year old, presenting for the following health issues:   Follow Up        8/18/2023     8:37 AM   Additional Questions   Roomed by Karla       History of Present Illness       Mental Health Follow-up:  Patient presents to follow-up on Depression & Anxiety.Patient's depression since last visit has been:  Medium  The patient is not having other symptoms associated with depression.  Patient's anxiety since last visit has been:  Medium  The patient is not having other symptoms associated with anxiety.  Any significant life events: No  Patient is feeling anxious or having panic attacks.  Patient has no concerns about alcohol or drug use.    She eats 2-3 servings of fruits and vegetables daily.She consumes 3 sweetened beverage(s) daily.She exercises with enough effort to increase her heart rate 10 to 19 minutes per day.  She exercises with enough effort to increase her heart rate 7 days per week.   She is taking medications regularly.         Video visit patient today to follow-up on anxiety and depression.      Review of Systems   Constitutional, HEENT, cardiovascular, pulmonary, gi and gu systems are negative, except as otherwise noted.      Objective           Vitals:  No vitals were obtained today due to virtual visit.    Physical Exam   GENERAL: Healthy, alert and no distress  EYES: Eyes grossly normal to inspection.  No discharge or erythema, or obvious scleral/conjunctival abnormalities.  RESP: No audible wheeze, cough, or visible cyanosis.  No visible retractions or increased work of breathing.    SKIN: Visible skin clear. No significant rash, abnormal pigmentation or lesions.  NEURO: Cranial nerves grossly intact.   Mentation and speech appropriate for age.  PSYCH: Mentation appears normal, affect normal/bright, judgement and insight intact, normal speech and appearance well-groomed.    Past labs reviewed with the patient.             Video-Visit Details    Type of service:  Video Visit   Video Start Time:  1135  Video End Time: 1143    Originating Location (pt. Location): Home    Distant Location (provider location):  Off-site  Platform used for Video Visit: [employer]

## 2023-08-29 DIAGNOSIS — F41.9 ANXIETY: ICD-10-CM

## 2023-08-29 RX ORDER — HYDROXYZINE HYDROCHLORIDE 25 MG/1
25 TABLET, FILM COATED ORAL 3 TIMES DAILY PRN
Qty: 30 TABLET | Refills: 0 | OUTPATIENT
Start: 2023-08-29

## 2024-05-30 ENCOUNTER — MYC REFILL (OUTPATIENT)
Dept: FAMILY MEDICINE | Facility: CLINIC | Age: 29
End: 2024-05-30
Payer: COMMERCIAL

## 2024-05-30 DIAGNOSIS — F41.9 ANXIETY: ICD-10-CM

## 2024-05-31 RX ORDER — HYDROXYZINE HYDROCHLORIDE 50 MG/1
50 TABLET, FILM COATED ORAL 3 TIMES DAILY PRN
Qty: 60 TABLET | Refills: 2 | Status: SHIPPED | OUTPATIENT
Start: 2024-05-31 | End: 2024-09-30

## 2024-06-27 ASSESSMENT — ANXIETY QUESTIONNAIRES
2. NOT BEING ABLE TO STOP OR CONTROL WORRYING: MORE THAN HALF THE DAYS
8. IF YOU CHECKED OFF ANY PROBLEMS, HOW DIFFICULT HAVE THESE MADE IT FOR YOU TO DO YOUR WORK, TAKE CARE OF THINGS AT HOME, OR GET ALONG WITH OTHER PEOPLE?: SOMEWHAT DIFFICULT
IF YOU CHECKED OFF ANY PROBLEMS ON THIS QUESTIONNAIRE, HOW DIFFICULT HAVE THESE PROBLEMS MADE IT FOR YOU TO DO YOUR WORK, TAKE CARE OF THINGS AT HOME, OR GET ALONG WITH OTHER PEOPLE: SOMEWHAT DIFFICULT
7. FEELING AFRAID AS IF SOMETHING AWFUL MIGHT HAPPEN: SEVERAL DAYS
7. FEELING AFRAID AS IF SOMETHING AWFUL MIGHT HAPPEN: SEVERAL DAYS
4. TROUBLE RELAXING: SEVERAL DAYS
GAD7 TOTAL SCORE: 10
1. FEELING NERVOUS, ANXIOUS, OR ON EDGE: SEVERAL DAYS
GAD7 TOTAL SCORE: 10
3. WORRYING TOO MUCH ABOUT DIFFERENT THINGS: MORE THAN HALF THE DAYS
GAD7 TOTAL SCORE: 10
5. BEING SO RESTLESS THAT IT IS HARD TO SIT STILL: SEVERAL DAYS
6. BECOMING EASILY ANNOYED OR IRRITABLE: MORE THAN HALF THE DAYS

## 2024-06-28 ENCOUNTER — VIRTUAL VISIT (OUTPATIENT)
Dept: FAMILY MEDICINE | Facility: CLINIC | Age: 29
End: 2024-06-28
Attending: FAMILY MEDICINE
Payer: COMMERCIAL

## 2024-06-28 DIAGNOSIS — F32.81 PMDD (PREMENSTRUAL DYSPHORIC DISORDER): ICD-10-CM

## 2024-06-28 DIAGNOSIS — F33.1 MODERATE EPISODE OF RECURRENT MAJOR DEPRESSIVE DISORDER (H): Primary | ICD-10-CM

## 2024-06-28 DIAGNOSIS — R41.840 POOR CONCENTRATION: ICD-10-CM

## 2024-06-28 PROCEDURE — 99442 PR PHYSICIAN TELEPHONE EVALUATION 11-20 MIN: CPT | Mod: 93 | Performed by: FAMILY MEDICINE

## 2024-06-28 RX ORDER — BUPROPION HYDROCHLORIDE 300 MG/1
300 TABLET ORAL EVERY MORNING
Qty: 90 TABLET | Refills: 1 | Status: SHIPPED | OUTPATIENT
Start: 2024-06-28

## 2024-06-28 RX ORDER — ESCITALOPRAM OXALATE 10 MG/1
10 TABLET ORAL DAILY
Qty: 30 TABLET | Refills: 0 | Status: SHIPPED | OUTPATIENT
Start: 2024-06-28 | End: 2024-08-16

## 2024-06-28 NOTE — PROGRESS NOTES
"    Instructions Relayed to Patient by Virtual Roomer:     Patient is active on [employer]:   Relayed following to patient: \"It looks like you are active on [employer], are you able to join the visit this way? If not, do you need us to send you a link now or would you like your provider to send a link via text or email when they are ready to initiate the visit?\"      Patient Confirmed they will join visit via: Provider to call patient for telephone visit   Reminded patient to ensure they were logged on to virtual visit by arrival time listed.   Asked if patient has flexibility to initiate visit sooner than arrival time: patient stated yes, documented in appointment notes availability to initiate visit earlier than arrival time     If pediatric virtual visit, ensured pediatric patient along with parent/guardian will be present for video visit.     Patient offered the website [email or web address] and/or phone number to [employer] Help line: [phone]      Answers submitted by the patient for this visit:  Patient Health Questionnaire (Submitted on 6/27/2024)  If you checked off any problems, how difficult have these problems made it for you to do your work, take care of things at home, or get along with other people?: Somewhat difficult  PHQ9 TOTAL SCORE: 14  CARLOS-7 (Submitted on 6/27/2024)  CARLOS 7 TOTAL SCORE: 10  Depression / Anxiety Questionnaire (Submitted on 6/27/2024)  Chief Complaint: Chronic problems general questions HPI Form  Depression/Anxiety: Depression & Anxiety  Depression & Anxiety (Submitted on 6/27/2024)  Chief Complaint: Chronic problems general questions HPI Form  Status since last visit:: medium  Anxiety since last: : medium  Other associated symptoms of depression:: No  Other associated symotome: : No  Significant life event: : financial concerns  Anxious:: Yes  Current substance use:: No  General Questionnaire (Submitted on 6/27/2024)  Chief Complaint: Chronic problems general questions " HPI Form  How many servings of fruits and vegetables do you eat daily?: 2-3  On average, how many sweetened beverages do you drink each day (Examples: soda, juice, sweet tea, etc.  Do NOT count diet or artificially sweetened beverages)?: 4  How many minutes a day do you exercise enough to make your heart beat faster?: 30 to 60  How many days a week do you exercise enough to make your heart beat faster?: 3 or less  How many days per week do you miss taking your medication?: 1  What makes it hard for you to take your medication every day?: remembering to take  Cassie is a 28 year old who is being evaluated via a billable telephone visit.    What phone number would you like to be contacted at? [phone]   How would you like to obtain your AVS? [employer]  Originating Location (pt. Location): Home    Distant Location (provider location):  Off-site    Assessment & Plan     Moderate episode of recurrent major depressive disorder (H)  From a pure depression standpoint she is generally doing okay on her current regimen of the Wellbutrin.  But found that she has been more irritable lately and it does seem to pattern around her cycle but it can last for a solid couple of weeks or so so it is more often than not.  Finds herself raising her voice and yelling at kids her  more often and just not feeling herself.  So we talked about premenstrual and menstrual changes that can take place interfering with mood.  Patient does have a therapist in place.  Talked about the addition of an SSRI at least in the premenstrual timeframe but she may benefit from more continual therapy.  Trial of Lexapro and contact me with results.  - buPROPion (WELLBUTRIN XL) 300 MG 24 hr tablet; Take 1 tablet (300 mg) by mouth every morning  - escitalopram (LEXAPRO) 10 MG tablet; Take 1 tablet (10 mg) by mouth daily    PMDD (premenstrual dysphoric disorder)  As above  - escitalopram (LEXAPRO) 10 MG tablet; Take 1 tablet (10 mg) by mouth daily    Poor  concentration  As above  - buPROPion (WELLBUTRIN XL) 300 MG 24 hr tablet; Take 1 tablet (300 mg) by mouth every morning          Depression Screening Follow Up        Regular exercise  See Patient Instructions    Ginny Matthews is a 28 year old, presenting for the following health issues:  Depression and Anxiety        6/28/2024    10:08 AM   Additional Questions   Roomed by Faviola   Accompanied by self     History of Present Illness       Mental Health Follow-up:  Patient presents to follow-up on Depression & Anxiety.Patient's depression since last visit has been:  Medium  The patient is not having other symptoms associated with depression.  Patient's anxiety since last visit has been:  Medium  The patient is not having other symptoms associated with anxiety.  Any significant life events: financial concerns  Patient is feeling anxious or having panic attacks.  Patient has no concerns about alcohol or drug use.    She eats 2-3 servings of fruits and vegetables daily.She consumes 4 sweetened beverage(s) daily.She exercises with enough effort to increase her heart rate 30 to 60 minutes per day.  She exercises with enough effort to increase her heart rate 3 or less days per week. She is missing 1 dose(s) of medications per week.  She is not taking prescribed medications regularly due to remembering to take.         Visit with patient today to follow-up on depression but having some more irritability.      Review of Systems  Constitutional, HEENT, cardiovascular, pulmonary, gi and gu systems are negative, except as otherwise noted.      Objective           Vitals:  No vitals were obtained today due to virtual visit.    Physical Exam   General: Alert and no distress //Respiratory: No audible wheeze, cough, or shortness of breath // Psychiatric:  Appropriate affect, tone, and pace of words      Past labs reviewed with the patient.       Phone call duration: 14 minutes  Signed Electronically by: Flor Olsen,  MD

## 2024-08-16 DIAGNOSIS — F33.1 MODERATE EPISODE OF RECURRENT MAJOR DEPRESSIVE DISORDER (H): ICD-10-CM

## 2024-08-16 DIAGNOSIS — F32.81 PMDD (PREMENSTRUAL DYSPHORIC DISORDER): ICD-10-CM

## 2024-08-16 RX ORDER — ESCITALOPRAM OXALATE 10 MG/1
10 TABLET ORAL DAILY
Qty: 30 TABLET | Refills: 0 | Status: SHIPPED | OUTPATIENT
Start: 2024-08-16 | End: 2024-09-30

## 2024-09-07 ENCOUNTER — HEALTH MAINTENANCE LETTER (OUTPATIENT)
Age: 29
End: 2024-09-07

## 2024-09-30 ENCOUNTER — MYC REFILL (OUTPATIENT)
Dept: FAMILY MEDICINE | Facility: CLINIC | Age: 29
End: 2024-09-30
Payer: COMMERCIAL

## 2024-09-30 DIAGNOSIS — F33.1 MODERATE EPISODE OF RECURRENT MAJOR DEPRESSIVE DISORDER (H): ICD-10-CM

## 2024-09-30 DIAGNOSIS — F41.9 ANXIETY: ICD-10-CM

## 2024-09-30 DIAGNOSIS — F32.81 PMDD (PREMENSTRUAL DYSPHORIC DISORDER): ICD-10-CM

## 2024-09-30 RX ORDER — HYDROXYZINE HYDROCHLORIDE 50 MG/1
50 TABLET, FILM COATED ORAL 3 TIMES DAILY PRN
Qty: 60 TABLET | Refills: 2 | OUTPATIENT
Start: 2024-09-30

## 2024-09-30 RX ORDER — ESCITALOPRAM OXALATE 10 MG/1
10 TABLET ORAL DAILY
Qty: 30 TABLET | Refills: 0 | OUTPATIENT
Start: 2024-09-30

## 2024-09-30 RX ORDER — ESCITALOPRAM OXALATE 10 MG/1
10 TABLET ORAL DAILY
Qty: 30 TABLET | Refills: 0 | Status: SHIPPED | OUTPATIENT
Start: 2024-09-30

## 2024-09-30 RX ORDER — HYDROXYZINE HYDROCHLORIDE 50 MG/1
50 TABLET, FILM COATED ORAL 3 TIMES DAILY PRN
Qty: 60 TABLET | Refills: 0 | Status: SHIPPED | OUTPATIENT
Start: 2024-09-30

## 2024-11-13 ENCOUNTER — MYC REFILL (OUTPATIENT)
Dept: FAMILY MEDICINE | Facility: CLINIC | Age: 29
End: 2024-11-13
Payer: COMMERCIAL

## 2024-11-13 DIAGNOSIS — F32.81 PMDD (PREMENSTRUAL DYSPHORIC DISORDER): ICD-10-CM

## 2024-11-13 DIAGNOSIS — F41.9 ANXIETY: ICD-10-CM

## 2024-11-13 DIAGNOSIS — F33.1 MODERATE EPISODE OF RECURRENT MAJOR DEPRESSIVE DISORDER (H): ICD-10-CM

## 2024-11-13 RX ORDER — ESCITALOPRAM OXALATE 10 MG/1
10 TABLET ORAL DAILY
Qty: 90 TABLET | Refills: 0 | Status: SHIPPED | OUTPATIENT
Start: 2024-11-13 | End: 2024-11-15

## 2024-11-13 RX ORDER — HYDROXYZINE HYDROCHLORIDE 50 MG/1
50 TABLET, FILM COATED ORAL 3 TIMES DAILY PRN
Qty: 60 TABLET | Refills: 0 | Status: SHIPPED | OUTPATIENT
Start: 2024-11-13 | End: 2024-11-15

## 2024-11-15 ENCOUNTER — MYC REFILL (OUTPATIENT)
Dept: FAMILY MEDICINE | Facility: CLINIC | Age: 29
End: 2024-11-15
Payer: COMMERCIAL

## 2024-11-15 DIAGNOSIS — F41.9 ANXIETY: ICD-10-CM

## 2024-11-15 DIAGNOSIS — F32.81 PMDD (PREMENSTRUAL DYSPHORIC DISORDER): ICD-10-CM

## 2024-11-15 DIAGNOSIS — F33.1 MODERATE EPISODE OF RECURRENT MAJOR DEPRESSIVE DISORDER (H): ICD-10-CM

## 2024-11-15 RX ORDER — HYDROXYZINE HYDROCHLORIDE 50 MG/1
50 TABLET, FILM COATED ORAL 3 TIMES DAILY PRN
Qty: 180 TABLET | Refills: 1 | Status: SHIPPED | OUTPATIENT
Start: 2024-11-15

## 2024-11-15 RX ORDER — ESCITALOPRAM OXALATE 10 MG/1
10 TABLET ORAL DAILY
Qty: 90 TABLET | Refills: 0 | Status: SHIPPED | OUTPATIENT
Start: 2024-11-15

## 2024-12-20 ENCOUNTER — MYC REFILL (OUTPATIENT)
Dept: FAMILY MEDICINE | Facility: CLINIC | Age: 29
End: 2024-12-20
Payer: COMMERCIAL

## 2024-12-20 DIAGNOSIS — R41.840 POOR CONCENTRATION: ICD-10-CM

## 2024-12-20 DIAGNOSIS — F33.1 MODERATE EPISODE OF RECURRENT MAJOR DEPRESSIVE DISORDER (H): ICD-10-CM

## 2024-12-23 RX ORDER — BUPROPION HYDROCHLORIDE 300 MG/1
300 TABLET ORAL EVERY MORNING
Qty: 90 TABLET | Refills: 1 | Status: SHIPPED | OUTPATIENT
Start: 2024-12-23

## 2025-01-13 ENCOUNTER — MYC REFILL (OUTPATIENT)
Dept: FAMILY MEDICINE | Facility: CLINIC | Age: 30
End: 2025-01-13
Payer: COMMERCIAL

## 2025-01-13 DIAGNOSIS — F41.9 ANXIETY: ICD-10-CM

## 2025-01-13 RX ORDER — HYDROXYZINE HYDROCHLORIDE 50 MG/1
50 TABLET, FILM COATED ORAL 3 TIMES DAILY PRN
Qty: 180 TABLET | Refills: 1 | Status: SHIPPED | OUTPATIENT
Start: 2025-01-13

## 2025-04-15 ENCOUNTER — MYC REFILL (OUTPATIENT)
Dept: FAMILY MEDICINE | Facility: CLINIC | Age: 30
End: 2025-04-15
Payer: COMMERCIAL

## 2025-04-15 DIAGNOSIS — R41.840 POOR CONCENTRATION: ICD-10-CM

## 2025-04-15 DIAGNOSIS — F33.1 MODERATE EPISODE OF RECURRENT MAJOR DEPRESSIVE DISORDER (H): ICD-10-CM

## 2025-04-15 RX ORDER — BUPROPION HYDROCHLORIDE 300 MG/1
300 TABLET ORAL EVERY MORNING
Qty: 90 TABLET | Refills: 1 | OUTPATIENT
Start: 2025-04-15

## 2025-06-23 DIAGNOSIS — F33.1 MODERATE EPISODE OF RECURRENT MAJOR DEPRESSIVE DISORDER (H): ICD-10-CM

## 2025-06-23 DIAGNOSIS — F32.81 PMDD (PREMENSTRUAL DYSPHORIC DISORDER): ICD-10-CM

## 2025-06-23 RX ORDER — ESCITALOPRAM OXALATE 10 MG/1
10 TABLET ORAL DAILY
Qty: 90 TABLET | Refills: 0 | OUTPATIENT
Start: 2025-06-23

## (undated) DEVICE — DRAPE POUCH IRR 1016

## (undated) DEVICE — GLOVE PROTEXIS BLUE W/NEU-THERA 7.5  2D73EB75

## (undated) DEVICE — SU ETHILON 2 CT-2 30" D-6865

## (undated) DEVICE — LIGHT HANDLE X2

## (undated) DEVICE — GLOVE ESTEEM POWDER FREE SMT 7.5  2D72PT75

## (undated) DEVICE — LUBRICATING JELLY 2.7GM T00137

## (undated) DEVICE — PREP POVIDONE IODINE USP 7.5% CLEANING SOL 64538161

## (undated) DEVICE — TUBING SUCTION 10'X3/16" N510

## (undated) DEVICE — DRAPE GYN/UROLOGY FLUID POUCH TUR 29455

## (undated) DEVICE — SYR BULB IRRIG 50ML LATEX FREE 0035280

## (undated) DEVICE — PREP POVIDONE IODINE USP 10% TOPICAL SOL 64537161

## (undated) DEVICE — SPONGE RAY-TEC 4X8" 7318

## (undated) DEVICE — NDL COUNTER 20CT 31142493

## (undated) DEVICE — SUCTION TIP YANKAUER W/O VENT K86

## (undated) DEVICE — SOL WATER IRRIG 1000ML BOTTLE 07139-09

## (undated) DEVICE — SOL NACL 0.9% IRRIG 1000ML BOTTLE 2F7124

## (undated) DEVICE — CATH TRAY FOLEY 16FR BARDEX W/DRAIN BAG STATLOCK 300316A

## (undated) DEVICE — Device

## (undated) RX ORDER — FENTANYL CITRATE 50 UG/ML
INJECTION, SOLUTION INTRAMUSCULAR; INTRAVENOUS
Status: DISPENSED
Start: 2021-01-05